# Patient Record
Sex: FEMALE | Race: OTHER | HISPANIC OR LATINO
[De-identification: names, ages, dates, MRNs, and addresses within clinical notes are randomized per-mention and may not be internally consistent; named-entity substitution may affect disease eponyms.]

---

## 2021-10-07 ENCOUNTER — TRANSCRIPTION ENCOUNTER (OUTPATIENT)
Age: 21
End: 2021-10-07

## 2021-12-08 ENCOUNTER — TRANSCRIPTION ENCOUNTER (OUTPATIENT)
Age: 21
End: 2021-12-08

## 2022-08-30 ENCOUNTER — NON-APPOINTMENT (OUTPATIENT)
Age: 22
End: 2022-08-30

## 2022-10-05 ENCOUNTER — NON-APPOINTMENT (OUTPATIENT)
Age: 22
End: 2022-10-05

## 2022-12-13 ENCOUNTER — NON-APPOINTMENT (OUTPATIENT)
Age: 22
End: 2022-12-13

## 2023-08-09 ENCOUNTER — APPOINTMENT (OUTPATIENT)
Dept: NEUROLOGY | Facility: CLINIC | Age: 23
End: 2023-08-09

## 2023-08-14 ENCOUNTER — EMERGENCY (EMERGENCY)
Facility: HOSPITAL | Age: 23
LOS: 1 days | Discharge: HOME | End: 2023-08-14
Attending: EMERGENCY MEDICINE
Payer: COMMERCIAL

## 2023-08-14 VITALS
SYSTOLIC BLOOD PRESSURE: 110 MMHG | RESPIRATION RATE: 18 BRPM | OXYGEN SATURATION: 98 % | TEMPERATURE: 98 F | HEART RATE: 66 BPM | DIASTOLIC BLOOD PRESSURE: 67 MMHG

## 2023-08-14 VITALS
DIASTOLIC BLOOD PRESSURE: 72 MMHG | SYSTOLIC BLOOD PRESSURE: 105 MMHG | HEART RATE: 82 BPM | TEMPERATURE: 98 F | RESPIRATION RATE: 18 BRPM | OXYGEN SATURATION: 97 % | WEIGHT: 132.06 LBS | HEIGHT: 59 IN

## 2023-08-14 LAB
ALBUMIN SERPL ELPH-MCNC: 4.5 G/DL — SIGNIFICANT CHANGE UP (ref 3.3–5)
ALP SERPL-CCNC: 91 U/L — SIGNIFICANT CHANGE UP (ref 40–120)
ALT FLD-CCNC: 18 U/L — SIGNIFICANT CHANGE UP (ref 10–45)
ANION GAP SERPL CALC-SCNC: 15 MMOL/L — SIGNIFICANT CHANGE UP (ref 5–17)
APPEARANCE UR: CLEAR — SIGNIFICANT CHANGE UP
APTT BLD: 31.8 SEC — SIGNIFICANT CHANGE UP (ref 24.5–35.6)
AST SERPL-CCNC: 14 U/L — SIGNIFICANT CHANGE UP (ref 10–40)
BACTERIA # UR AUTO: ABNORMAL
BASOPHILS # BLD AUTO: 0.03 K/UL — SIGNIFICANT CHANGE UP (ref 0–0.2)
BASOPHILS NFR BLD AUTO: 0.4 % — SIGNIFICANT CHANGE UP (ref 0–2)
BILIRUB SERPL-MCNC: 0.7 MG/DL — SIGNIFICANT CHANGE UP (ref 0.2–1.2)
BILIRUB UR-MCNC: NEGATIVE — SIGNIFICANT CHANGE UP
BUN SERPL-MCNC: 12 MG/DL — SIGNIFICANT CHANGE UP (ref 7–23)
CALCIUM SERPL-MCNC: 9.5 MG/DL — SIGNIFICANT CHANGE UP (ref 8.4–10.5)
CHLORIDE SERPL-SCNC: 103 MMOL/L — SIGNIFICANT CHANGE UP (ref 96–108)
CO2 SERPL-SCNC: 21 MMOL/L — LOW (ref 22–31)
COLOR SPEC: YELLOW — SIGNIFICANT CHANGE UP
CREAT SERPL-MCNC: 0.84 MG/DL — SIGNIFICANT CHANGE UP (ref 0.5–1.3)
DIFF PNL FLD: ABNORMAL
EGFR: 101 ML/MIN/1.73M2 — SIGNIFICANT CHANGE UP
EOSINOPHIL # BLD AUTO: 0.14 K/UL — SIGNIFICANT CHANGE UP (ref 0–0.5)
EOSINOPHIL NFR BLD AUTO: 1.7 % — SIGNIFICANT CHANGE UP (ref 0–6)
EPI CELLS # UR: 7 /HPF — HIGH
GLUCOSE SERPL-MCNC: 93 MG/DL — SIGNIFICANT CHANGE UP (ref 70–99)
GLUCOSE UR QL: NEGATIVE — SIGNIFICANT CHANGE UP
HCG SERPL-ACNC: <2 MIU/ML — SIGNIFICANT CHANGE UP
HCG UR QL: NEGATIVE — SIGNIFICANT CHANGE UP
HCT VFR BLD CALC: 37.7 % — SIGNIFICANT CHANGE UP (ref 34.5–45)
HGB BLD-MCNC: 12.3 G/DL — SIGNIFICANT CHANGE UP (ref 11.5–15.5)
HIV 1 & 2 AB SERPL IA.RAPID: SIGNIFICANT CHANGE UP
HYALINE CASTS # UR AUTO: 4 /LPF — HIGH (ref 0–2)
IMM GRANULOCYTES NFR BLD AUTO: 0.4 % — SIGNIFICANT CHANGE UP (ref 0–0.9)
INR BLD: 1.14 RATIO — SIGNIFICANT CHANGE UP (ref 0.85–1.18)
KETONES UR-MCNC: NEGATIVE — SIGNIFICANT CHANGE UP
LEUKOCYTE ESTERASE UR-ACNC: ABNORMAL
LYMPHOCYTES # BLD AUTO: 2.75 K/UL — SIGNIFICANT CHANGE UP (ref 1–3.3)
LYMPHOCYTES # BLD AUTO: 33.1 % — SIGNIFICANT CHANGE UP (ref 13–44)
MCHC RBC-ENTMCNC: 27.5 PG — SIGNIFICANT CHANGE UP (ref 27–34)
MCHC RBC-ENTMCNC: 32.6 GM/DL — SIGNIFICANT CHANGE UP (ref 32–36)
MCV RBC AUTO: 84.2 FL — SIGNIFICANT CHANGE UP (ref 80–100)
MONOCYTES # BLD AUTO: 0.73 K/UL — SIGNIFICANT CHANGE UP (ref 0–0.9)
MONOCYTES NFR BLD AUTO: 8.8 % — SIGNIFICANT CHANGE UP (ref 2–14)
NEUTROPHILS # BLD AUTO: 4.63 K/UL — SIGNIFICANT CHANGE UP (ref 1.8–7.4)
NEUTROPHILS NFR BLD AUTO: 55.6 % — SIGNIFICANT CHANGE UP (ref 43–77)
NITRITE UR-MCNC: POSITIVE
NRBC # BLD: 0 /100 WBCS — SIGNIFICANT CHANGE UP (ref 0–0)
PH UR: 5.5 — SIGNIFICANT CHANGE UP (ref 5–8)
PLATELET # BLD AUTO: 336 K/UL — SIGNIFICANT CHANGE UP (ref 150–400)
POTASSIUM SERPL-MCNC: 3.6 MMOL/L — SIGNIFICANT CHANGE UP (ref 3.5–5.3)
POTASSIUM SERPL-SCNC: 3.6 MMOL/L — SIGNIFICANT CHANGE UP (ref 3.5–5.3)
PROT SERPL-MCNC: 7.3 G/DL — SIGNIFICANT CHANGE UP (ref 6–8.3)
PROT UR-MCNC: ABNORMAL
PROTHROM AB SERPL-ACNC: 11.9 SEC — SIGNIFICANT CHANGE UP (ref 9.5–13)
RAPID RVP RESULT: SIGNIFICANT CHANGE UP
RBC # BLD: 4.48 M/UL — SIGNIFICANT CHANGE UP (ref 3.8–5.2)
RBC # FLD: 13.4 % — SIGNIFICANT CHANGE UP (ref 10.3–14.5)
RBC CASTS # UR COMP ASSIST: 3 /HPF — SIGNIFICANT CHANGE UP (ref 0–4)
SARS-COV-2 RNA SPEC QL NAA+PROBE: SIGNIFICANT CHANGE UP
SODIUM SERPL-SCNC: 139 MMOL/L — SIGNIFICANT CHANGE UP (ref 135–145)
SP GR SPEC: 1.03 — HIGH (ref 1.01–1.02)
TSH SERPL-MCNC: 2.4 UIU/ML — SIGNIFICANT CHANGE UP (ref 0.27–4.2)
UROBILINOGEN FLD QL: ABNORMAL
WBC # BLD: 8.31 K/UL — SIGNIFICANT CHANGE UP (ref 3.8–10.5)
WBC # FLD AUTO: 8.31 K/UL — SIGNIFICANT CHANGE UP (ref 3.8–10.5)
WBC UR QL: 9 /HPF — HIGH (ref 0–5)

## 2023-08-14 PROCEDURE — 85730 THROMBOPLASTIN TIME PARTIAL: CPT

## 2023-08-14 PROCEDURE — 76830 TRANSVAGINAL US NON-OB: CPT

## 2023-08-14 PROCEDURE — 83735 ASSAY OF MAGNESIUM: CPT

## 2023-08-14 PROCEDURE — 99285 EMERGENCY DEPT VISIT HI MDM: CPT

## 2023-08-14 PROCEDURE — 87186 SC STD MICRODIL/AGAR DIL: CPT

## 2023-08-14 PROCEDURE — 84443 ASSAY THYROID STIM HORMONE: CPT

## 2023-08-14 PROCEDURE — 87491 CHLMYD TRACH DNA AMP PROBE: CPT

## 2023-08-14 PROCEDURE — 86703 HIV-1/HIV-2 1 RESULT ANTBDY: CPT

## 2023-08-14 PROCEDURE — 81001 URINALYSIS AUTO W/SCOPE: CPT

## 2023-08-14 PROCEDURE — 84702 CHORIONIC GONADOTROPIN TEST: CPT

## 2023-08-14 PROCEDURE — 93975 VASCULAR STUDY: CPT

## 2023-08-14 PROCEDURE — 85610 PROTHROMBIN TIME: CPT

## 2023-08-14 PROCEDURE — 0225U NFCT DS DNA&RNA 21 SARSCOV2: CPT

## 2023-08-14 PROCEDURE — 76830 TRANSVAGINAL US NON-OB: CPT | Mod: 26

## 2023-08-14 PROCEDURE — 99285 EMERGENCY DEPT VISIT HI MDM: CPT | Mod: 25

## 2023-08-14 PROCEDURE — 80053 COMPREHEN METABOLIC PANEL: CPT

## 2023-08-14 PROCEDURE — 36415 COLL VENOUS BLD VENIPUNCTURE: CPT

## 2023-08-14 PROCEDURE — 87591 N.GONORRHOEAE DNA AMP PROB: CPT

## 2023-08-14 PROCEDURE — 85025 COMPLETE CBC W/AUTO DIFF WBC: CPT

## 2023-08-14 PROCEDURE — 81025 URINE PREGNANCY TEST: CPT

## 2023-08-14 PROCEDURE — 93975 VASCULAR STUDY: CPT | Mod: 26

## 2023-08-14 PROCEDURE — 93005 ELECTROCARDIOGRAM TRACING: CPT

## 2023-08-14 PROCEDURE — 87086 URINE CULTURE/COLONY COUNT: CPT

## 2023-08-14 RX ORDER — CEFPODOXIME PROXETIL 100 MG
1 TABLET ORAL
Qty: 14 | Refills: 0
Start: 2023-08-14 | End: 2023-08-20

## 2023-08-14 RX ORDER — ACETAMINOPHEN 500 MG
975 TABLET ORAL ONCE
Refills: 0 | Status: COMPLETED | OUTPATIENT
Start: 2023-08-14 | End: 2023-08-14

## 2023-08-14 RX ORDER — CEFPODOXIME PROXETIL 100 MG
100 TABLET ORAL ONCE
Refills: 0 | Status: COMPLETED | OUTPATIENT
Start: 2023-08-14 | End: 2023-08-14

## 2023-08-14 RX ORDER — SODIUM CHLORIDE 9 MG/ML
1000 INJECTION INTRAMUSCULAR; INTRAVENOUS; SUBCUTANEOUS ONCE
Refills: 0 | Status: DISCONTINUED | OUTPATIENT
Start: 2023-08-14 | End: 2023-08-14

## 2023-08-14 RX ADMIN — Medication 975 MILLIGRAM(S): at 13:30

## 2023-08-14 RX ADMIN — Medication 100 MILLIGRAM(S): at 14:18

## 2023-08-14 RX ADMIN — Medication 975 MILLIGRAM(S): at 12:35

## 2023-08-14 NOTE — ED ADULT NURSE NOTE - OBJECTIVE STATEMENT
1215 24 y/o f to er w/friend w/c/o resolved palpitations last wk,head pressure on and off x 2wks,and mid pelvic pain xlast thurs. w/white discharge, low grade temp this am. LMP.7/11-15. no odor to discharge. 1215 22 y/o f to er w/friend w/c/o resolved palpitations last wk,head pressure on and off x 2wks,and mid pelvic pain xlast thurs. w/white discharge, low grade temp this am. LMP.7/11-15. no odor to discharge. 1215 22 y/o f to er w/friend w/c/o resolved palpitations last wk,head pressure on and off x 2wks,and mid pelvic pain xlast thurs. w/white discharge, low grade temp this am. LMP.7/11-15. no odor to discharge.1430 TO US. 1215 24 y/o f to er w/friend w/c/o resolved palpitations last wk,head pressure on and off x 2wks,and mid pelvic pain xlast thurs. w/white discharge, low grade temp this am. LMP.7/11-15. no odor to discharge.1430 TO US. 1215 22 y/o f to er w/friend w/c/o resolved palpitations last wk,head pressure on and off x 2wks,and mid pelvic pain xlast thurs. w/white discharge, low grade temp this am. LMP.7/11-15. no odor to discharge.1430 TO US.1530 ret from US w/o c/o.

## 2023-08-14 NOTE — ED PROVIDER NOTE - ATTENDING APP SHARED VISIT CONTRIBUTION OF CARE
RGUJRAL 22yo female hx listed presents with fever, dysuria and urgency. Pt also complains of mild vaginal discharge, sexually active with 1 partner. Pt also reports mild palpitations that were self limiting. On exam, Patient is awake,alert,oriented x 3. Patient is well appearing and in no acute distress. Patient's chest is clear to ausculation, +s1s2. Abdomen is soft nd/nt +BS. Extremity with no swelling or calf tenderness. No cvat. Pelvic exam see PA note.   Check labs, UA, Pelvic sono to eval for UTI, TOA, ovarian cyst. IVF and supportive care and monitor. RGUJRAL 24yo female hx listed presents with fever, dysuria and urgency. Pt also complains of mild vaginal discharge, sexually active with 1 partner. Pt also reports mild palpitations that were self limiting. On exam, Patient is awake,alert,oriented x 3. Patient is well appearing and in no acute distress. Patient's chest is clear to ausculation, +s1s2. Abdomen is soft nd/nt +BS. Extremity with no swelling or calf tenderness. No cvat. Pelvic exam see PA note.   Check labs, UA, Pelvic sono to eval for UTI, TOA, ovarian cyst. IVF and supportive care and monitor.

## 2023-08-14 NOTE — ED ADULT NURSE NOTE - NSFALLUNIVINTERV_ED_ALL_ED
Bed/Stretcher in lowest position, wheels locked, appropriate side rails in place/Call bell, personal items and telephone in reach/Instruct patient to call for assistance before getting out of bed/chair/stretcher/Non-slip footwear applied when patient is off stretcher/[place] to call system/Physically safe environment - no spills, clutter or unnecessary equipment/Purposeful proactive rounding/Room/bathroom lighting operational, light cord in reach Bed/Stretcher in lowest position, wheels locked, appropriate side rails in place/Call bell, personal items and telephone in reach/Instruct patient to call for assistance before getting out of bed/chair/stretcher/Non-slip footwear applied when patient is off stretcher/[place] to call system/Physically safe environment - no spills, clutter or unnecessary equipment/Purposeful proactive rounding/Room/bathroom lighting operational, light cord in reach Bed/Stretcher in lowest position, wheels locked, appropriate side rails in place/Call bell, personal items and telephone in reach/Instruct patient to call for assistance before getting out of bed/chair/stretcher/Non-slip footwear applied when patient is off stretcher/[place] to call system/Physically safe environment - no spills, clutter or unnecessary equipment/Purposeful proactive rounding/Room/bathroom lighting operational, light cord in reach

## 2023-08-14 NOTE — ED PROVIDER NOTE - PATIENT PORTAL LINK FT
You can access the FollowMyHealth Patient Portal offered by [facility] by registering at the following website: [email or web address]. By joining [employer]’s FollowMyHealth portal, you will also be able to view your health information using other applications (apps) compatible with our system. You can access the FollowMyHealth Patient Portal offered by [facility] by registering at the following website: [email or web address]. By joining [employer]’s FollowMyHealth portal, you will also be able to view your health information using other applications (apps) compatible with our system. You can access the FollowMyHealth Patient Portal offered by [facility] by registering at the following website: [email or web address]. By joining [employer]’s FollowMyHealth portal, you will also be able to view your health information using other applications (apps) compatible with our system.

## 2023-08-14 NOTE — ED PROVIDER NOTE - OBJECTIVE STATEMENT
23-year-old female history of ovarian cyst presenting to the emergency department with several complaints.  Patient is reporting 5 days of an increase in vaginal discharge and consistency of vaginal discharge.  Patient reports she is sexually active with 1 partner only and has no concern for STDs.  Patient denies prior history of STDs however has been treated in the past for BV.  Patient does not have an OB/GYN last menstrual period was July 11, reports they are typically irregular.  Patient not on OCPs and does not use protection.  Patient was tested for STDs in May but would like to be tested again today.  Patient also endorsing that she has associated dull pelvic pressure in addition to the increased discharge.  Pain seems to be midline rather than unilateral and feels different than her ovarian cysts.  Patient also had a temp of 100.2 orally this morning.  Patient endorsing waxing and waning bitemporal headaches over the last several days. no other URI sx or sick contacts.  Patient also notes dysuria and some urge incontinence intermittently for 2 weeks.  Lastly patient reports an episode of palpitations that occurred last week, self resolved after 1 day without intervention.  Patient does not have a primary care physician that she can follow-up with.  Patient has not been to any care provider for any of the above symptoms.

## 2023-08-14 NOTE — ED PROVIDER NOTE - NSFOLLOWUPINSTRUCTIONS_ED_ALL_ED_FT
stay hydrated  Take antibiotics as prescribed  Follow up with gynecology regarding your irregular cycles.   Follow up with primary care to establish care  Bring a copy of your test results with you to your appointment  Continue your current medication regimen  Return to the Emergency Room if you experience new or worsening symptoms

## 2023-08-14 NOTE — ED PROVIDER NOTE - PROGRESS NOTE DETAILS
Discussed all results with patient and recommended follow-up with OB/GYN for ultrasound findings and irregular menses.  Will send antibiotic to pharmacy of choice for urinary tract infection.  Patient is well-appearing at this time, comfortable with plan and was given the opportunity to ask questions.

## 2023-08-15 LAB
C TRACH RRNA SPEC QL NAA+PROBE: SIGNIFICANT CHANGE UP
N GONORRHOEA RRNA SPEC QL NAA+PROBE: SIGNIFICANT CHANGE UP
SPECIMEN SOURCE: SIGNIFICANT CHANGE UP

## 2023-08-17 LAB
-  AMIKACIN: SIGNIFICANT CHANGE UP
-  AMOXICILLIN/CLAVULANIC ACID: SIGNIFICANT CHANGE UP
-  AMPICILLIN/SULBACTAM: SIGNIFICANT CHANGE UP
-  AMPICILLIN: SIGNIFICANT CHANGE UP
-  AZTREONAM: SIGNIFICANT CHANGE UP
-  CEFAZOLIN: SIGNIFICANT CHANGE UP
-  CEFEPIME: SIGNIFICANT CHANGE UP
-  CEFOXITIN: SIGNIFICANT CHANGE UP
-  CEFTRIAXONE: SIGNIFICANT CHANGE UP
-  CEFUROXIME: SIGNIFICANT CHANGE UP
-  CIPROFLOXACIN: SIGNIFICANT CHANGE UP
-  ERTAPENEM: SIGNIFICANT CHANGE UP
-  GENTAMICIN: SIGNIFICANT CHANGE UP
-  IMIPENEM: SIGNIFICANT CHANGE UP
-  LEVOFLOXACIN: SIGNIFICANT CHANGE UP
-  MEROPENEM: SIGNIFICANT CHANGE UP
-  NITROFURANTOIN: SIGNIFICANT CHANGE UP
-  PIPERACILLIN/TAZOBACTAM: SIGNIFICANT CHANGE UP
-  TOBRAMYCIN: SIGNIFICANT CHANGE UP
-  TRIMETHOPRIM/SULFAMETHOXAZOLE: SIGNIFICANT CHANGE UP
CULTURE RESULTS: SIGNIFICANT CHANGE UP
METHOD TYPE: SIGNIFICANT CHANGE UP
ORGANISM # SPEC MICROSCOPIC CNT: SIGNIFICANT CHANGE UP
SPECIMEN SOURCE: SIGNIFICANT CHANGE UP

## 2023-08-18 NOTE — ED POST DISCHARGE NOTE - DETAILS
8/18/23 Roselyn PA- final sensitivities show cefpodoxime (antibiotic pt prescribed here) is sensitive. appropriate management given.

## 2023-09-29 ENCOUNTER — APPOINTMENT (OUTPATIENT)
Dept: OBGYN | Facility: CLINIC | Age: 23
End: 2023-09-29
Payer: COMMERCIAL

## 2023-09-29 PROCEDURE — 36415 COLL VENOUS BLD VENIPUNCTURE: CPT

## 2023-09-29 PROCEDURE — 99203 OFFICE O/P NEW LOW 30 MIN: CPT

## 2023-10-01 ENCOUNTER — TRANSCRIPTION ENCOUNTER (OUTPATIENT)
Age: 23
End: 2023-10-01

## 2024-01-08 ENCOUNTER — APPOINTMENT (OUTPATIENT)
Dept: OBGYN | Facility: CLINIC | Age: 24
End: 2024-01-08
Payer: COMMERCIAL

## 2024-01-08 PROCEDURE — 99213 OFFICE O/P EST LOW 20 MIN: CPT | Mod: 25

## 2024-01-08 PROCEDURE — 76817 TRANSVAGINAL US OBSTETRIC: CPT

## 2024-01-08 PROCEDURE — 36415 COLL VENOUS BLD VENIPUNCTURE: CPT

## 2024-01-19 ENCOUNTER — APPOINTMENT (OUTPATIENT)
Dept: OBGYN | Facility: CLINIC | Age: 24
End: 2024-01-19
Payer: COMMERCIAL

## 2024-01-19 PROBLEM — N83.209 UNSPECIFIED OVARIAN CYST, UNSPECIFIED SIDE: Chronic | Status: ACTIVE | Noted: 2023-08-14

## 2024-01-19 PROCEDURE — 76817 TRANSVAGINAL US OBSTETRIC: CPT

## 2024-01-19 PROCEDURE — 0502F SUBSEQUENT PRENATAL CARE: CPT

## 2024-02-02 ENCOUNTER — APPOINTMENT (OUTPATIENT)
Dept: OBGYN | Facility: CLINIC | Age: 24
End: 2024-02-02
Payer: COMMERCIAL

## 2024-02-02 PROCEDURE — 0502F SUBSEQUENT PRENATAL CARE: CPT

## 2024-02-02 PROCEDURE — 76817 TRANSVAGINAL US OBSTETRIC: CPT

## 2024-02-23 ENCOUNTER — APPOINTMENT (OUTPATIENT)
Dept: OBGYN | Facility: CLINIC | Age: 24
End: 2024-02-23
Payer: COMMERCIAL

## 2024-02-23 PROCEDURE — 36415 COLL VENOUS BLD VENIPUNCTURE: CPT

## 2024-02-23 PROCEDURE — 76817 TRANSVAGINAL US OBSTETRIC: CPT

## 2024-02-23 PROCEDURE — 0502F SUBSEQUENT PRENATAL CARE: CPT

## 2024-02-27 ENCOUNTER — NON-APPOINTMENT (OUTPATIENT)
Age: 24
End: 2024-02-27

## 2024-03-05 PROBLEM — Z00.00 ENCOUNTER FOR PREVENTIVE HEALTH EXAMINATION: Status: ACTIVE | Noted: 2024-03-05

## 2024-03-06 ENCOUNTER — APPOINTMENT (OUTPATIENT)
Dept: OBGYN | Facility: CLINIC | Age: 24
End: 2024-03-06

## 2024-03-11 ENCOUNTER — NON-APPOINTMENT (OUTPATIENT)
Age: 24
End: 2024-03-11

## 2024-03-20 ENCOUNTER — OUTPATIENT (OUTPATIENT)
Dept: OUTPATIENT SERVICES | Facility: HOSPITAL | Age: 24
LOS: 1 days | End: 2024-03-20
Payer: MEDICAID

## 2024-03-20 ENCOUNTER — LABORATORY RESULT (OUTPATIENT)
Age: 24
End: 2024-03-20

## 2024-03-20 ENCOUNTER — NON-APPOINTMENT (OUTPATIENT)
Age: 24
End: 2024-03-20

## 2024-03-20 ENCOUNTER — APPOINTMENT (OUTPATIENT)
Dept: OBGYN | Facility: CLINIC | Age: 24
End: 2024-03-20
Payer: MEDICAID

## 2024-03-20 VITALS
DIASTOLIC BLOOD PRESSURE: 82 MMHG | HEIGHT: 64 IN | SYSTOLIC BLOOD PRESSURE: 124 MMHG | BODY MASS INDEX: 24.41 KG/M2 | WEIGHT: 143 LBS

## 2024-03-20 DIAGNOSIS — Z34.80 ENCOUNTER FOR SUPERVISION OF OTHER NORMAL PREGNANCY, UNSPECIFIED TRIMESTER: ICD-10-CM

## 2024-03-20 PROCEDURE — G0463: CPT

## 2024-03-20 PROCEDURE — 36415 COLL VENOUS BLD VENIPUNCTURE: CPT

## 2024-03-20 PROCEDURE — 86850 RBC ANTIBODY SCREEN: CPT

## 2024-03-20 PROCEDURE — G0452: CPT | Mod: 26

## 2024-03-20 PROCEDURE — 99203 OFFICE O/P NEW LOW 30 MIN: CPT

## 2024-03-20 PROCEDURE — 87077 CULTURE AEROBIC IDENTIFY: CPT

## 2024-03-20 PROCEDURE — 81220 CFTR GENE COM VARIANTS: CPT

## 2024-03-20 PROCEDURE — 87086 URINE CULTURE/COLONY COUNT: CPT

## 2024-03-20 PROCEDURE — 81329 SMN1 GENE DOS/DELETION ALYS: CPT

## 2024-03-20 PROCEDURE — 81243 FMR1 GEN ALY DETC ABNL ALLEL: CPT

## 2024-03-20 PROCEDURE — 86900 BLOOD TYPING SEROLOGIC ABO: CPT

## 2024-03-20 RX ORDER — ASPIRIN ENTERIC COATED TABLETS 81 MG 81 MG/1
81 TABLET, DELAYED RELEASE ORAL
Qty: 90 | Refills: 4 | Status: ACTIVE | COMMUNITY
Start: 2024-03-20 | End: 1900-01-01

## 2024-03-21 ENCOUNTER — APPOINTMENT (OUTPATIENT)
Dept: CARDIOLOGY | Facility: CLINIC | Age: 24
End: 2024-03-21
Payer: MEDICAID

## 2024-03-21 VITALS — HEART RATE: 96 BPM | SYSTOLIC BLOOD PRESSURE: 122 MMHG | OXYGEN SATURATION: 100 % | DIASTOLIC BLOOD PRESSURE: 81 MMHG

## 2024-03-21 VITALS — BODY MASS INDEX: 28.83 KG/M2 | WEIGHT: 143 LBS | HEIGHT: 59 IN

## 2024-03-21 DIAGNOSIS — Z78.9 OTHER SPECIFIED HEALTH STATUS: ICD-10-CM

## 2024-03-21 DIAGNOSIS — R51.9 HEADACHE, UNSPECIFIED: ICD-10-CM

## 2024-03-21 DIAGNOSIS — H93.8X1 OTHER SPECIFIED DISORDERS OF RIGHT EAR: ICD-10-CM

## 2024-03-21 DIAGNOSIS — Z87.891 PERSONAL HISTORY OF NICOTINE DEPENDENCE: ICD-10-CM

## 2024-03-21 DIAGNOSIS — R06.00 DYSPNEA, UNSPECIFIED: ICD-10-CM

## 2024-03-21 DIAGNOSIS — Z34.90 ENCOUNTER FOR SUPERVISION OF NORMAL PREGNANCY, UNSPECIFIED, UNSPECIFIED TRIMESTER: ICD-10-CM

## 2024-03-21 DIAGNOSIS — R07.89 OTHER CHEST PAIN: ICD-10-CM

## 2024-03-21 DIAGNOSIS — R00.2 PALPITATIONS: ICD-10-CM

## 2024-03-21 DIAGNOSIS — R55 SYNCOPE AND COLLAPSE: ICD-10-CM

## 2024-03-21 DIAGNOSIS — R42 DIZZINESS AND GIDDINESS: ICD-10-CM

## 2024-03-21 DIAGNOSIS — O09.32 SUPERVISION OF PREGNANCY WITH INSUFFICIENT ANTENATAL CARE, SECOND TRIMESTER: ICD-10-CM

## 2024-03-21 PROCEDURE — 99205 OFFICE O/P NEW HI 60 MIN: CPT

## 2024-03-21 PROCEDURE — G2211 COMPLEX E/M VISIT ADD ON: CPT | Mod: NC,1L

## 2024-03-21 PROCEDURE — 93246 EXT ECG>7D<15D RECORDING: CPT

## 2024-03-21 PROCEDURE — 93000 ELECTROCARDIOGRAM COMPLETE: CPT | Mod: 59

## 2024-03-21 NOTE — PHYSICAL EXAM
[Well Developed] : well developed [No Acute Distress] : no acute distress [Well Nourished] : well nourished [Normal Conjunctiva] : normal conjunctiva [Normal Venous Pressure] : normal venous pressure [No Carotid Bruit] : no carotid bruit [Normal S1, S2] : normal S1, S2 [No Murmur] : no murmur [No Gallop] : no gallop [No Rub] : no rub [Good Air Entry] : good air entry [Clear Lung Fields] : clear lung fields [Soft] : abdomen soft [No Respiratory Distress] : no respiratory distress  [Non Tender] : non-tender [No Masses/organomegaly] : no masses/organomegaly [Normal Bowel Sounds] : normal bowel sounds [Normal Gait] : normal gait [No Edema] : no edema [No Cyanosis] : no cyanosis [No Clubbing] : no clubbing [No Rash] : no rash [No Varicosities] : no varicosities [No Skin Lesions] : no skin lesions [Moves all extremities] : moves all extremities [No Focal Deficits] : no focal deficits [Normal Speech] : normal speech [Alert and Oriented] : alert and oriented [Normal memory] : normal memory

## 2024-03-21 NOTE — HISTORY OF PRESENT ILLNESS
[FreeTextEntry1] : This is a very pleasant 23-year-old woman with no significant past medical history, currently 14 weeks pregnant in her second trimester presenting for evaluation of palpitations.  She has had palpitations in the past but it has gotten worse with pregnancy.  Last week she felt a episode of vasovagal syncope without actually losing consciousness.  She describes a ascending sensation of warmth, tunnel vision, chest pressure.  She sat down and had some candy which improved her symptoms.  Since then she has had 1 more episode of palpitations, this time at rest while laying in bed.  She denies having caffeine, drugs or alcohol to trigger palpitations.  She has had morning sickness but has been able to tolerate food and is hydrating and eating well.  Denies shortness of breath, chest pain, orthopnea, PND.  EKG today appears to be sinus rhythm without evidence of ischemia

## 2024-03-21 NOTE — DISCUSSION/SUMMARY
[FreeTextEntry1] : Ms. KAYLA CASTRO is a very pleasant 23 year old woman, 14 weeks pregnant presenting with palpitations.   #Palpitations -Explained to patient that second trimester of pregnancy is where volume expansion occurs and as such requires hydration and sustenance.  She can continue to feel palpitations, which are most likely benign.  However given the fact that the symptoms preceded her pregnancy would be prudent to further investigate with a Holter monitor. -14-day Zio patch was ordered and placed on patient in office today - Encouraged to decrease caffeine intake as much as possible, currently not drinking any - Encourage to continue exercise - ECG sinus rhythm today - 14 day Ziopatch ordered to assess for arrhythmia - Stress-lowering therapies and exercises were strongly recommended  Follow-up 2 months [EKG obtained to assist in diagnosis and management of assessed problem(s)] : EKG obtained to assist in diagnosis and management of assessed problem(s)

## 2024-03-22 ENCOUNTER — APPOINTMENT (OUTPATIENT)
Dept: MATERNAL FETAL MEDICINE | Facility: CLINIC | Age: 24
End: 2024-03-22

## 2024-03-22 LAB
CULTURE RESULTS: SIGNIFICANT CHANGE UP
FRAGILE X PROTEIN (FMRP) PNL BLD: SIGNIFICANT CHANGE UP
SPECIMEN SOURCE: SIGNIFICANT CHANGE UP

## 2024-03-26 LAB — SMA INTERPRETATION: SIGNIFICANT CHANGE UP

## 2024-03-27 LAB — CYSTIC FIBROSIS EXPANDED PANEL: SIGNIFICANT CHANGE UP

## 2024-04-01 ENCOUNTER — NON-APPOINTMENT (OUTPATIENT)
Age: 24
End: 2024-04-01

## 2024-04-01 ENCOUNTER — LABORATORY RESULT (OUTPATIENT)
Age: 24
End: 2024-04-01

## 2024-04-01 ENCOUNTER — OUTPATIENT (OUTPATIENT)
Dept: OUTPATIENT SERVICES | Facility: HOSPITAL | Age: 24
LOS: 1 days | End: 2024-04-01
Payer: MEDICAID

## 2024-04-01 ENCOUNTER — ASOB RESULT (OUTPATIENT)
Age: 24
End: 2024-04-01

## 2024-04-01 ENCOUNTER — APPOINTMENT (OUTPATIENT)
Dept: OBGYN | Facility: CLINIC | Age: 24
End: 2024-04-01
Payer: MEDICAID

## 2024-04-01 ENCOUNTER — APPOINTMENT (OUTPATIENT)
Dept: ANTEPARTUM | Facility: CLINIC | Age: 24
End: 2024-04-01
Payer: MEDICAID

## 2024-04-01 VITALS
SYSTOLIC BLOOD PRESSURE: 116 MMHG | WEIGHT: 142 LBS | BODY MASS INDEX: 26.81 KG/M2 | DIASTOLIC BLOOD PRESSURE: 74 MMHG | HEIGHT: 61 IN

## 2024-04-01 DIAGNOSIS — Z34.80 ENCOUNTER FOR SUPERVISION OF OTHER NORMAL PREGNANCY, UNSPECIFIED TRIMESTER: ICD-10-CM

## 2024-04-01 DIAGNOSIS — O09.32 SUPERVISION OF PREGNANCY WITH INSUFFICIENT ANTENATAL CARE, SECOND TRIMESTER: ICD-10-CM

## 2024-04-01 PROCEDURE — 82105 ALPHA-FETOPROTEIN SERUM: CPT

## 2024-04-01 PROCEDURE — 36415 COLL VENOUS BLD VENIPUNCTURE: CPT

## 2024-04-01 PROCEDURE — 81003 URINALYSIS AUTO W/O SCOPE: CPT

## 2024-04-01 PROCEDURE — 99213 OFFICE O/P EST LOW 20 MIN: CPT

## 2024-04-01 PROCEDURE — G0463: CPT

## 2024-04-01 PROCEDURE — 76805 OB US >/= 14 WKS SNGL FETUS: CPT

## 2024-04-01 PROCEDURE — 87086 URINE CULTURE/COLONY COUNT: CPT

## 2024-04-01 PROCEDURE — 86480 TB TEST CELL IMMUN MEASURE: CPT

## 2024-04-02 DIAGNOSIS — O09.32 SUPERVISION OF PREGNANCY WITH INSUFFICIENT ANTENATAL CARE, SECOND TRIMESTER: ICD-10-CM

## 2024-04-02 LAB
CULTURE RESULTS: SIGNIFICANT CHANGE UP
SPECIMEN SOURCE: SIGNIFICANT CHANGE UP

## 2024-04-03 LAB
GAMMA INTERFERON BACKGROUND BLD IA-ACNC: 0.05 IU/ML — SIGNIFICANT CHANGE UP
M TB IFN-G BLD-IMP: NEGATIVE — SIGNIFICANT CHANGE UP
M TB IFN-G CD4+ BCKGRND COR BLD-ACNC: 0 IU/ML — SIGNIFICANT CHANGE UP
M TB IFN-G CD4+CD8+ BCKGRND COR BLD-ACNC: 0 IU/ML — SIGNIFICANT CHANGE UP
QUANT TB PLUS MITOGEN MINUS NIL: >10 IU/ML — SIGNIFICANT CHANGE UP

## 2024-04-04 ENCOUNTER — APPOINTMENT (OUTPATIENT)
Dept: OBGYN | Facility: CLINIC | Age: 24
End: 2024-04-04

## 2024-04-05 LAB
AFP ADJ MOM SERPL: 1.45 — SIGNIFICANT CHANGE UP
AFP INTERP SERPL-IMP: SIGNIFICANT CHANGE UP
AFP INTERP SERPL-IMP: SIGNIFICANT CHANGE UP
AFP SERPL-MCNC: 49.9 NG/ML — SIGNIFICANT CHANGE UP
AGE AT DELIVERY: 23.2 YR — SIGNIFICANT CHANGE UP
ALPHA FETOPROTEIN SERUM COMMENT: SIGNIFICANT CHANGE UP
ALPHA FETOPROTEIN SERUM RESULTS: SIGNIFICANT CHANGE UP
GA METHOD: SIGNIFICANT CHANGE UP
GA: 15.9 WEEKS — SIGNIFICANT CHANGE UP
IDDM PATIENT QL: NO — SIGNIFICANT CHANGE UP
MULTIPLE PREGNANCY: NO — SIGNIFICANT CHANGE UP
NEURAL TUBE DEFECT RISK FETUS: 3133 — SIGNIFICANT CHANGE UP
RACE AFP: SIGNIFICANT CHANGE UP

## 2024-04-18 ENCOUNTER — TRANSCRIPTION ENCOUNTER (OUTPATIENT)
Age: 24
End: 2024-04-18

## 2024-04-19 ENCOUNTER — NON-APPOINTMENT (OUTPATIENT)
Age: 24
End: 2024-04-19

## 2024-04-22 ENCOUNTER — APPOINTMENT (OUTPATIENT)
Dept: OBGYN | Facility: CLINIC | Age: 24
End: 2024-04-22
Payer: MEDICAID

## 2024-04-22 ENCOUNTER — OUTPATIENT (OUTPATIENT)
Dept: OUTPATIENT SERVICES | Facility: HOSPITAL | Age: 24
LOS: 1 days | End: 2024-04-22
Payer: MEDICAID

## 2024-04-22 VITALS — SYSTOLIC BLOOD PRESSURE: 120 MMHG | WEIGHT: 148 LBS | DIASTOLIC BLOOD PRESSURE: 74 MMHG

## 2024-04-22 DIAGNOSIS — Z34.90 ENCOUNTER FOR SUPERVISION OF NORMAL PREGNANCY, UNSPECIFIED, UNSPECIFIED TRIMESTER: ICD-10-CM

## 2024-04-22 DIAGNOSIS — Z34.80 ENCOUNTER FOR SUPERVISION OF OTHER NORMAL PREGNANCY, UNSPECIFIED TRIMESTER: ICD-10-CM

## 2024-04-22 PROCEDURE — 81003 URINALYSIS AUTO W/O SCOPE: CPT

## 2024-04-22 PROCEDURE — 99213 OFFICE O/P EST LOW 20 MIN: CPT

## 2024-04-22 PROCEDURE — G0463: CPT

## 2024-04-29 DIAGNOSIS — Z34.90 ENCOUNTER FOR SUPERVISION OF NORMAL PREGNANCY, UNSPECIFIED, UNSPECIFIED TRIMESTER: ICD-10-CM

## 2024-04-29 DIAGNOSIS — Z34.92 ENCOUNTER FOR SUPERVISION OF NORMAL PREGNANCY, UNSPECIFIED, SECOND TRIMESTER: ICD-10-CM

## 2024-05-06 ENCOUNTER — APPOINTMENT (OUTPATIENT)
Dept: ANTEPARTUM | Facility: CLINIC | Age: 24
End: 2024-05-06

## 2024-05-06 ENCOUNTER — APPOINTMENT (OUTPATIENT)
Dept: OBGYN | Facility: CLINIC | Age: 24
End: 2024-05-06

## 2024-05-13 ENCOUNTER — APPOINTMENT (OUTPATIENT)
Dept: ANTEPARTUM | Facility: CLINIC | Age: 24
End: 2024-05-13
Payer: MEDICAID

## 2024-05-13 ENCOUNTER — ASOB RESULT (OUTPATIENT)
Age: 24
End: 2024-05-13

## 2024-05-13 PROCEDURE — 76816 OB US FOLLOW-UP PER FETUS: CPT

## 2024-05-20 ENCOUNTER — APPOINTMENT (OUTPATIENT)
Dept: OBGYN | Facility: CLINIC | Age: 24
End: 2024-05-20
Payer: MEDICAID

## 2024-05-20 ENCOUNTER — LABORATORY RESULT (OUTPATIENT)
Age: 24
End: 2024-05-20

## 2024-05-20 ENCOUNTER — OUTPATIENT (OUTPATIENT)
Dept: OUTPATIENT SERVICES | Facility: HOSPITAL | Age: 24
LOS: 1 days | End: 2024-05-20
Payer: MEDICAID

## 2024-05-20 VITALS — SYSTOLIC BLOOD PRESSURE: 120 MMHG | WEIGHT: 155 LBS | DIASTOLIC BLOOD PRESSURE: 80 MMHG

## 2024-05-20 DIAGNOSIS — Z34.92 ENCOUNTER FOR SUPERVISION OF NORMAL PREGNANCY, UNSPECIFIED, SECOND TRIMESTER: ICD-10-CM

## 2024-05-20 DIAGNOSIS — Z34.80 ENCOUNTER FOR SUPERVISION OF OTHER NORMAL PREGNANCY, UNSPECIFIED TRIMESTER: ICD-10-CM

## 2024-05-20 DIAGNOSIS — N89.8 OTHER SPECIFIED NONINFLAMMATORY DISORDERS OF VAGINA: ICD-10-CM

## 2024-05-20 PROCEDURE — 87491 CHLMYD TRACH DNA AMP PROBE: CPT

## 2024-05-20 PROCEDURE — 87661 TRICHOMONAS VAGINALIS AMPLIF: CPT

## 2024-05-20 PROCEDURE — 87481 CANDIDA DNA AMP PROBE: CPT

## 2024-05-20 PROCEDURE — 87591 N.GONORRHOEAE DNA AMP PROB: CPT

## 2024-05-20 PROCEDURE — 81513 NFCT DS BV RNA VAG FLU ALG: CPT

## 2024-05-20 PROCEDURE — 99213 OFFICE O/P EST LOW 20 MIN: CPT

## 2024-05-20 PROCEDURE — G0463: CPT

## 2024-05-20 PROCEDURE — 81003 URINALYSIS AUTO W/O SCOPE: CPT

## 2024-05-21 DIAGNOSIS — Z34.92 ENCOUNTER FOR SUPERVISION OF NORMAL PREGNANCY, UNSPECIFIED, SECOND TRIMESTER: ICD-10-CM

## 2024-05-21 LAB
BV BACTERIA RRNA VAG QL NAA+PROBE: SIGNIFICANT CHANGE UP
C GLABRATA RNA VAG QL NAA+PROBE: SIGNIFICANT CHANGE UP
C TRACH RRNA SPEC QL NAA+PROBE: SIGNIFICANT CHANGE UP
CANDIDA RRNA VAG QL PROBE: SIGNIFICANT CHANGE UP
N GONORRHOEA RRNA SPEC QL NAA+PROBE: SIGNIFICANT CHANGE UP
T VAGINALIS RRNA SPEC QL NAA+PROBE: SIGNIFICANT CHANGE UP

## 2024-06-07 ENCOUNTER — NON-APPOINTMENT (OUTPATIENT)
Age: 24
End: 2024-06-07

## 2024-06-14 ENCOUNTER — APPOINTMENT (OUTPATIENT)
Dept: CARDIOLOGY | Facility: CLINIC | Age: 24
End: 2024-06-14

## 2024-06-17 ENCOUNTER — OUTPATIENT (OUTPATIENT)
Dept: OUTPATIENT SERVICES | Facility: HOSPITAL | Age: 24
LOS: 1 days | End: 2024-06-17
Payer: MEDICAID

## 2024-06-17 ENCOUNTER — APPOINTMENT (OUTPATIENT)
Dept: OBGYN | Facility: CLINIC | Age: 24
End: 2024-06-17
Payer: MEDICAID

## 2024-06-17 ENCOUNTER — LABORATORY RESULT (OUTPATIENT)
Age: 24
End: 2024-06-17

## 2024-06-17 VITALS — DIASTOLIC BLOOD PRESSURE: 80 MMHG | SYSTOLIC BLOOD PRESSURE: 128 MMHG | WEIGHT: 157 LBS

## 2024-06-17 DIAGNOSIS — Z34.80 ENCOUNTER FOR SUPERVISION OF OTHER NORMAL PREGNANCY, UNSPECIFIED TRIMESTER: ICD-10-CM

## 2024-06-17 PROCEDURE — 99213 OFFICE O/P EST LOW 20 MIN: CPT

## 2024-06-18 DIAGNOSIS — Z34.92 ENCOUNTER FOR SUPERVISION OF NORMAL PREGNANCY, UNSPECIFIED, SECOND TRIMESTER: ICD-10-CM

## 2024-06-18 DIAGNOSIS — Z34.90 ENCOUNTER FOR SUPERVISION OF NORMAL PREGNANCY, UNSPECIFIED, UNSPECIFIED TRIMESTER: ICD-10-CM

## 2024-06-18 DIAGNOSIS — R73.09 OTHER ABNORMAL GLUCOSE: ICD-10-CM

## 2024-06-18 DIAGNOSIS — O99.810 ABNORMAL GLUCOSE COMPLICATING PREGNANCY: ICD-10-CM

## 2024-06-18 LAB — GLUCOSE 1H P MEAL SERPL-MCNC: 137 MG/DL — HIGH (ref 70–134)

## 2024-06-21 ENCOUNTER — LABORATORY RESULT (OUTPATIENT)
Age: 24
End: 2024-06-21

## 2024-06-21 PROCEDURE — 82952 GTT-ADDED SAMPLES: CPT

## 2024-06-21 PROCEDURE — G0463: CPT

## 2024-06-21 PROCEDURE — 82950 GLUCOSE TEST: CPT

## 2024-06-21 PROCEDURE — 81003 URINALYSIS AUTO W/O SCOPE: CPT

## 2024-06-21 PROCEDURE — 82951 GLUCOSE TOLERANCE TEST (GTT): CPT

## 2024-06-22 LAB
GESTATIONAL GTT PNL UR+SERPL: 90 MG/DL — SIGNIFICANT CHANGE UP (ref 70–94)
GLUCOSE 1H P CHAL SERPL-MCNC: 167 MG/DL — SIGNIFICANT CHANGE UP (ref 70–179)
GTT GEST 2H PNL UR+SERPL: 114 MG/DL — SIGNIFICANT CHANGE UP (ref 70–154)
GTT GEST 3H PNL SERPL: 158 MG/DL — HIGH (ref 70–139)

## 2024-06-23 ENCOUNTER — NON-APPOINTMENT (OUTPATIENT)
Age: 24
End: 2024-06-23

## 2024-06-27 ENCOUNTER — NON-APPOINTMENT (OUTPATIENT)
Age: 24
End: 2024-06-27

## 2024-06-28 ENCOUNTER — NON-APPOINTMENT (OUTPATIENT)
Age: 24
End: 2024-06-28

## 2024-06-28 DIAGNOSIS — U07.1 COVID-19: ICD-10-CM

## 2024-07-08 ENCOUNTER — APPOINTMENT (OUTPATIENT)
Dept: OBGYN | Facility: CLINIC | Age: 24
End: 2024-07-08
Payer: MEDICAID

## 2024-07-08 ENCOUNTER — APPOINTMENT (OUTPATIENT)
Dept: ANTEPARTUM | Facility: CLINIC | Age: 24
End: 2024-07-08
Payer: MEDICAID

## 2024-07-08 ENCOUNTER — ASOB RESULT (OUTPATIENT)
Age: 24
End: 2024-07-08

## 2024-07-08 ENCOUNTER — OUTPATIENT (OUTPATIENT)
Dept: OUTPATIENT SERVICES | Facility: HOSPITAL | Age: 24
LOS: 1 days | End: 2024-07-08
Payer: MEDICAID

## 2024-07-08 ENCOUNTER — LABORATORY RESULT (OUTPATIENT)
Age: 24
End: 2024-07-08

## 2024-07-08 VITALS — WEIGHT: 157 LBS | DIASTOLIC BLOOD PRESSURE: 72 MMHG | SYSTOLIC BLOOD PRESSURE: 118 MMHG

## 2024-07-08 DIAGNOSIS — Z34.80 ENCOUNTER FOR SUPERVISION OF OTHER NORMAL PREGNANCY, UNSPECIFIED TRIMESTER: ICD-10-CM

## 2024-07-08 DIAGNOSIS — O99.019 ANEMIA COMPLICATING PREGNANCY, UNSPECIFIED TRIMESTER: ICD-10-CM

## 2024-07-08 PROCEDURE — 76816 OB US FOLLOW-UP PER FETUS: CPT

## 2024-07-08 PROCEDURE — 99213 OFFICE O/P EST LOW 20 MIN: CPT

## 2024-07-08 PROCEDURE — 81003 URINALYSIS AUTO W/O SCOPE: CPT

## 2024-07-08 PROCEDURE — G0463: CPT

## 2024-07-09 PROBLEM — O99.019 ANEMIA DURING PREGNANCY: Status: ACTIVE | Noted: 2024-07-09

## 2024-07-09 RX ORDER — SAW PALMETTO 160 MG
500 CAPSULE ORAL DAILY
Qty: 30 | Refills: 0 | Status: ACTIVE | COMMUNITY
Start: 2024-07-09 | End: 1900-01-01

## 2024-07-09 RX ORDER — FERROUS SULFATE TAB EC 325 MG (65 MG FE EQUIVALENT) 325 (65 FE) MG
325 (65 FE) TABLET DELAYED RESPONSE ORAL
Qty: 60 | Refills: 2 | Status: ACTIVE | COMMUNITY
Start: 2024-07-09 | End: 1900-01-01

## 2024-07-11 DIAGNOSIS — Z34.93 ENCOUNTER FOR SUPERVISION OF NORMAL PREGNANCY, UNSPECIFIED, THIRD TRIMESTER: ICD-10-CM

## 2024-07-11 DIAGNOSIS — Z34.90 ENCOUNTER FOR SUPERVISION OF NORMAL PREGNANCY, UNSPECIFIED, UNSPECIFIED TRIMESTER: ICD-10-CM

## 2024-07-23 ENCOUNTER — NON-APPOINTMENT (OUTPATIENT)
Age: 24
End: 2024-07-23

## 2024-07-29 ENCOUNTER — APPOINTMENT (OUTPATIENT)
Dept: OBGYN | Facility: CLINIC | Age: 24
End: 2024-07-29
Payer: MEDICAID

## 2024-07-29 ENCOUNTER — OUTPATIENT (OUTPATIENT)
Dept: OUTPATIENT SERVICES | Facility: HOSPITAL | Age: 24
LOS: 1 days | End: 2024-07-29
Payer: MEDICAID

## 2024-07-29 VITALS
SYSTOLIC BLOOD PRESSURE: 132 MMHG | WEIGHT: 160 LBS | DIASTOLIC BLOOD PRESSURE: 82 MMHG | BODY MASS INDEX: 30.21 KG/M2 | HEIGHT: 61 IN

## 2024-07-29 DIAGNOSIS — Z23 ENCOUNTER FOR IMMUNIZATION: ICD-10-CM

## 2024-07-29 DIAGNOSIS — Z34.90 ENCOUNTER FOR SUPERVISION OF NORMAL PREGNANCY, UNSPECIFIED, UNSPECIFIED TRIMESTER: ICD-10-CM

## 2024-07-29 DIAGNOSIS — Z34.80 ENCOUNTER FOR SUPERVISION OF OTHER NORMAL PREGNANCY, UNSPECIFIED TRIMESTER: ICD-10-CM

## 2024-07-29 DIAGNOSIS — Z34.93 ENCOUNTER FOR SUPERVISION OF NORMAL PREGNANCY, UNSPECIFIED, THIRD TRIMESTER: ICD-10-CM

## 2024-07-29 PROCEDURE — 99213 OFFICE O/P EST LOW 20 MIN: CPT

## 2024-07-31 ENCOUNTER — NON-APPOINTMENT (OUTPATIENT)
Age: 24
End: 2024-07-31

## 2024-08-05 ENCOUNTER — ASOB RESULT (OUTPATIENT)
Age: 24
End: 2024-08-05

## 2024-08-05 ENCOUNTER — APPOINTMENT (OUTPATIENT)
Dept: ANTEPARTUM | Facility: CLINIC | Age: 24
End: 2024-08-05

## 2024-08-05 PROCEDURE — 76816 OB US FOLLOW-UP PER FETUS: CPT

## 2024-08-12 ENCOUNTER — OUTPATIENT (OUTPATIENT)
Dept: OUTPATIENT SERVICES | Facility: HOSPITAL | Age: 24
LOS: 1 days | End: 2024-08-12
Payer: MEDICAID

## 2024-08-12 ENCOUNTER — APPOINTMENT (OUTPATIENT)
Dept: OBGYN | Facility: CLINIC | Age: 24
End: 2024-08-12
Payer: MEDICAID

## 2024-08-12 ENCOUNTER — LABORATORY RESULT (OUTPATIENT)
Age: 24
End: 2024-08-12

## 2024-08-12 ENCOUNTER — NON-APPOINTMENT (OUTPATIENT)
Age: 24
End: 2024-08-12

## 2024-08-12 ENCOUNTER — APPOINTMENT (OUTPATIENT)
Dept: OBGYN | Facility: CLINIC | Age: 24
End: 2024-08-12

## 2024-08-12 VITALS — WEIGHT: 165 LBS | SYSTOLIC BLOOD PRESSURE: 120 MMHG | DIASTOLIC BLOOD PRESSURE: 82 MMHG

## 2024-08-12 VITALS
BODY MASS INDEX: 31.15 KG/M2 | DIASTOLIC BLOOD PRESSURE: 82 MMHG | SYSTOLIC BLOOD PRESSURE: 120 MMHG | WEIGHT: 165 LBS | HEIGHT: 61 IN

## 2024-08-12 DIAGNOSIS — Z34.90 ENCOUNTER FOR SUPERVISION OF NORMAL PREGNANCY, UNSPECIFIED, UNSPECIFIED TRIMESTER: ICD-10-CM

## 2024-08-12 DIAGNOSIS — O26.893 OTHER SPECIFIED PREGNANCY RELATED CONDITIONS, THIRD TRIMESTER: ICD-10-CM

## 2024-08-12 DIAGNOSIS — Z34.92 ENCOUNTER FOR SUPERVISION OF NORMAL PREGNANCY, UNSPECIFIED, SECOND TRIMESTER: ICD-10-CM

## 2024-08-12 DIAGNOSIS — R51.9 OTHER SPECIFIED PREGNANCY RELATED CONDITIONS, THIRD TRIMESTER: ICD-10-CM

## 2024-08-12 PROCEDURE — 80053 COMPREHEN METABOLIC PANEL: CPT

## 2024-08-12 PROCEDURE — G0463: CPT

## 2024-08-12 PROCEDURE — 85610 PROTHROMBIN TIME: CPT

## 2024-08-12 PROCEDURE — 99213 OFFICE O/P EST LOW 20 MIN: CPT

## 2024-08-12 PROCEDURE — 84550 ASSAY OF BLOOD/URIC ACID: CPT

## 2024-08-12 PROCEDURE — 81003 URINALYSIS AUTO W/O SCOPE: CPT

## 2024-08-12 PROCEDURE — 83615 LACTATE (LD) (LDH) ENZYME: CPT

## 2024-08-12 PROCEDURE — 90471 IMMUNIZATION ADMIN: CPT

## 2024-08-12 PROCEDURE — 90651 9VHPV VACCINE 2/3 DOSE IM: CPT

## 2024-08-13 LAB
ALBUMIN SERPL ELPH-MCNC: 3.4 G/DL — SIGNIFICANT CHANGE UP (ref 3.3–5)
ALP SERPL-CCNC: 149 U/L — HIGH (ref 40–120)
ALT FLD-CCNC: <5 U/L — LOW (ref 10–45)
ANION GAP SERPL CALC-SCNC: 14 MMOL/L — SIGNIFICANT CHANGE UP (ref 5–17)
AST SERPL-CCNC: 12 U/L — SIGNIFICANT CHANGE UP (ref 10–40)
BILIRUB SERPL-MCNC: 0.4 MG/DL — SIGNIFICANT CHANGE UP (ref 0.2–1.2)
BUN SERPL-MCNC: 6 MG/DL — LOW (ref 7–23)
CALCIUM SERPL-MCNC: 10.3 MG/DL — SIGNIFICANT CHANGE UP (ref 8.4–10.5)
CHLORIDE SERPL-SCNC: 103 MMOL/L — SIGNIFICANT CHANGE UP (ref 96–108)
CO2 SERPL-SCNC: 21 MMOL/L — LOW (ref 22–31)
CREAT SERPL-MCNC: 0.7 MG/DL — SIGNIFICANT CHANGE UP (ref 0.5–1.3)
EGFR: 125 ML/MIN/1.73M2 — SIGNIFICANT CHANGE UP
GLUCOSE SERPL-MCNC: 92 MG/DL — SIGNIFICANT CHANGE UP (ref 70–99)
INR BLD: 0.98 RATIO — SIGNIFICANT CHANGE UP (ref 0.85–1.18)
LDH SERPL L TO P-CCNC: 154 U/L — SIGNIFICANT CHANGE UP (ref 50–242)
POTASSIUM SERPL-MCNC: 3.5 MMOL/L — SIGNIFICANT CHANGE UP (ref 3.5–5.3)
POTASSIUM SERPL-SCNC: 3.5 MMOL/L — SIGNIFICANT CHANGE UP (ref 3.5–5.3)
PROT SERPL-MCNC: 6.3 G/DL — SIGNIFICANT CHANGE UP (ref 6–8.3)
PROTHROM AB SERPL-ACNC: 11.1 SEC — SIGNIFICANT CHANGE UP (ref 9.5–13)
SODIUM SERPL-SCNC: 138 MMOL/L — SIGNIFICANT CHANGE UP (ref 135–145)
URATE SERPL-MCNC: 4.1 MG/DL — SIGNIFICANT CHANGE UP (ref 2.5–7)

## 2024-08-21 ENCOUNTER — NON-APPOINTMENT (OUTPATIENT)
Age: 24
End: 2024-08-21

## 2024-08-23 ENCOUNTER — APPOINTMENT (OUTPATIENT)
Dept: OBGYN | Facility: CLINIC | Age: 24
End: 2024-08-23
Payer: MEDICAID

## 2024-08-23 ENCOUNTER — OUTPATIENT (OUTPATIENT)
Dept: OUTPATIENT SERVICES | Facility: HOSPITAL | Age: 24
LOS: 1 days | End: 2024-08-23
Payer: MEDICAID

## 2024-08-23 ENCOUNTER — NON-APPOINTMENT (OUTPATIENT)
Age: 24
End: 2024-08-23

## 2024-08-23 ENCOUNTER — LABORATORY RESULT (OUTPATIENT)
Age: 24
End: 2024-08-23

## 2024-08-23 VITALS
SYSTOLIC BLOOD PRESSURE: 131 MMHG | TEMPERATURE: 98 F | RESPIRATION RATE: 16 BRPM | HEART RATE: 104 BPM | DIASTOLIC BLOOD PRESSURE: 90 MMHG

## 2024-08-23 VITALS — HEART RATE: 97 BPM | DIASTOLIC BLOOD PRESSURE: 63 MMHG | OXYGEN SATURATION: 96 % | SYSTOLIC BLOOD PRESSURE: 139 MMHG

## 2024-08-23 VITALS — SYSTOLIC BLOOD PRESSURE: 140 MMHG | WEIGHT: 166 LBS | DIASTOLIC BLOOD PRESSURE: 88 MMHG

## 2024-08-23 DIAGNOSIS — O09.32 SUPERVISION OF PREGNANCY WITH INSUFFICIENT ANTENATAL CARE, SECOND TRIMESTER: ICD-10-CM

## 2024-08-23 DIAGNOSIS — Z34.93 ENCOUNTER FOR SUPERVISION OF NORMAL PREGNANCY, UNSPECIFIED, THIRD TRIMESTER: ICD-10-CM

## 2024-08-23 DIAGNOSIS — Z34.90 ENCOUNTER FOR SUPERVISION OF NORMAL PREGNANCY, UNSPECIFIED, UNSPECIFIED TRIMESTER: ICD-10-CM

## 2024-08-23 DIAGNOSIS — O26.899 OTHER SPECIFIED PREGNANCY RELATED CONDITIONS, UNSPECIFIED TRIMESTER: ICD-10-CM

## 2024-08-23 DIAGNOSIS — O13.3 GESTATIONAL [PREGNANCY-INDUCED] HYPERTENSION W/OUT SIGNIFICANT PROTEINURIA, THIRD TRIMESTER: ICD-10-CM

## 2024-08-23 DIAGNOSIS — Z34.80 ENCOUNTER FOR SUPERVISION OF OTHER NORMAL PREGNANCY, UNSPECIFIED TRIMESTER: ICD-10-CM

## 2024-08-23 LAB
ALBUMIN SERPL ELPH-MCNC: 3 G/DL — LOW (ref 3.3–5)
ALP SERPL-CCNC: 144 U/L — HIGH (ref 40–120)
ALT FLD-CCNC: 9 U/L — LOW (ref 10–45)
ANION GAP SERPL CALC-SCNC: 12 MMOL/L — SIGNIFICANT CHANGE UP (ref 5–17)
APPEARANCE UR: ABNORMAL
AST SERPL-CCNC: 12 U/L — SIGNIFICANT CHANGE UP (ref 10–40)
BACTERIA # UR AUTO: ABNORMAL /HPF
BASOPHILS # BLD AUTO: 0.03 K/UL — SIGNIFICANT CHANGE UP (ref 0–0.2)
BASOPHILS NFR BLD AUTO: 0.2 % — SIGNIFICANT CHANGE UP (ref 0–2)
BILIRUB SERPL-MCNC: 0.4 MG/DL — SIGNIFICANT CHANGE UP (ref 0.2–1.2)
BILIRUB UR-MCNC: NEGATIVE — SIGNIFICANT CHANGE UP
BLD GP AB SCN SERPL QL: NEGATIVE — SIGNIFICANT CHANGE UP
BUN SERPL-MCNC: 6 MG/DL — LOW (ref 7–23)
CALCIUM SERPL-MCNC: 9.2 MG/DL — SIGNIFICANT CHANGE UP (ref 8.4–10.5)
CAST: 0 /LPF — SIGNIFICANT CHANGE UP (ref 0–4)
CHLORIDE SERPL-SCNC: 105 MMOL/L — SIGNIFICANT CHANGE UP (ref 96–108)
CO2 SERPL-SCNC: 21 MMOL/L — LOW (ref 22–31)
COLOR SPEC: YELLOW — SIGNIFICANT CHANGE UP
CREAT ?TM UR-MCNC: 66 MG/DL — SIGNIFICANT CHANGE UP
CREAT ?TM UR-MCNC: 94 MG/DL — SIGNIFICANT CHANGE UP
CREAT SERPL-MCNC: 0.64 MG/DL — SIGNIFICANT CHANGE UP (ref 0.5–1.3)
DIFF PNL FLD: ABNORMAL
EGFR: 127 ML/MIN/1.73M2 — SIGNIFICANT CHANGE UP
EOSINOPHIL # BLD AUTO: 0.22 K/UL — SIGNIFICANT CHANGE UP (ref 0–0.5)
EOSINOPHIL NFR BLD AUTO: 1.8 % — SIGNIFICANT CHANGE UP (ref 0–6)
GLUCOSE SERPL-MCNC: 90 MG/DL — SIGNIFICANT CHANGE UP (ref 70–99)
GLUCOSE UR QL: NEGATIVE MG/DL — SIGNIFICANT CHANGE UP
HCT VFR BLD CALC: 28.3 % — LOW (ref 34.5–45)
HGB BLD-MCNC: 9.7 G/DL — LOW (ref 11.5–15.5)
IMM GRANULOCYTES NFR BLD AUTO: 0.9 % — SIGNIFICANT CHANGE UP (ref 0–0.9)
KETONES UR-MCNC: NEGATIVE MG/DL — SIGNIFICANT CHANGE UP
LDH SERPL L TO P-CCNC: 302 U/L — HIGH (ref 50–242)
LEUKOCYTE ESTERASE UR-ACNC: ABNORMAL
LYMPHOCYTES # BLD AUTO: 19.2 % — SIGNIFICANT CHANGE UP (ref 13–44)
LYMPHOCYTES # BLD AUTO: 2.39 K/UL — SIGNIFICANT CHANGE UP (ref 1–3.3)
MCHC RBC-ENTMCNC: 27.7 PG — SIGNIFICANT CHANGE UP (ref 27–34)
MCHC RBC-ENTMCNC: 34.3 GM/DL — SIGNIFICANT CHANGE UP (ref 32–36)
MCV RBC AUTO: 80.9 FL — SIGNIFICANT CHANGE UP (ref 80–100)
MONOCYTES # BLD AUTO: 0.86 K/UL — SIGNIFICANT CHANGE UP (ref 0–0.9)
MONOCYTES NFR BLD AUTO: 6.9 % — SIGNIFICANT CHANGE UP (ref 2–14)
NEUTROPHILS # BLD AUTO: 8.84 K/UL — HIGH (ref 1.8–7.4)
NEUTROPHILS NFR BLD AUTO: 71 % — SIGNIFICANT CHANGE UP (ref 43–77)
NITRITE UR-MCNC: NEGATIVE — SIGNIFICANT CHANGE UP
NRBC # BLD: 0 /100 WBCS — SIGNIFICANT CHANGE UP (ref 0–0)
PH UR: 7.5 — SIGNIFICANT CHANGE UP (ref 5–8)
PLATELET # BLD AUTO: 242 K/UL — SIGNIFICANT CHANGE UP (ref 150–400)
POTASSIUM SERPL-MCNC: 3.3 MMOL/L — LOW (ref 3.5–5.3)
POTASSIUM SERPL-SCNC: 3.3 MMOL/L — LOW (ref 3.5–5.3)
PROT ?TM UR-MCNC: 20 MG/DL — HIGH (ref 0–12)
PROT ?TM UR-MCNC: 24 MG/DL — HIGH (ref 0–12)
PROT SERPL-MCNC: 6.4 G/DL — SIGNIFICANT CHANGE UP (ref 6–8.3)
PROT UR-MCNC: SIGNIFICANT CHANGE UP MG/DL
PROT/CREAT UR-RTO: 0.3 RATIO — HIGH (ref 0–0.2)
PROT/CREAT UR-RTO: 0.3 RATIO — HIGH (ref 0–0.2)
RBC # BLD: 3.5 M/UL — LOW (ref 3.8–5.2)
RBC # FLD: 14.2 % — SIGNIFICANT CHANGE UP (ref 10.3–14.5)
RBC CASTS # UR COMP ASSIST: 14 /HPF — HIGH (ref 0–4)
RH IG SCN BLD-IMP: POSITIVE — SIGNIFICANT CHANGE UP
SODIUM SERPL-SCNC: 138 MMOL/L — SIGNIFICANT CHANGE UP (ref 135–145)
SP GR SPEC: 1.01 — SIGNIFICANT CHANGE UP (ref 1–1.03)
SQUAMOUS # UR AUTO: 25 /HPF — HIGH (ref 0–5)
URATE SERPL-MCNC: 4 MG/DL — SIGNIFICANT CHANGE UP (ref 2.5–7)
UROBILINOGEN FLD QL: 1 MG/DL — SIGNIFICANT CHANGE UP (ref 0.2–1)
WBC # BLD: 12.45 K/UL — HIGH (ref 3.8–10.5)
WBC # FLD AUTO: 12.45 K/UL — HIGH (ref 3.8–10.5)
WBC UR QL: 6 /HPF — HIGH (ref 0–5)

## 2024-08-23 PROCEDURE — G0463: CPT

## 2024-08-23 PROCEDURE — 86850 RBC ANTIBODY SCREEN: CPT

## 2024-08-23 PROCEDURE — 84156 ASSAY OF PROTEIN URINE: CPT

## 2024-08-23 PROCEDURE — 84550 ASSAY OF BLOOD/URIC ACID: CPT

## 2024-08-23 PROCEDURE — 59025 FETAL NON-STRESS TEST: CPT

## 2024-08-23 PROCEDURE — 80053 COMPREHEN METABOLIC PANEL: CPT

## 2024-08-23 PROCEDURE — 85025 COMPLETE CBC W/AUTO DIFF WBC: CPT

## 2024-08-23 PROCEDURE — 81003 URINALYSIS AUTO W/O SCOPE: CPT

## 2024-08-23 PROCEDURE — 83615 LACTATE (LD) (LDH) ENZYME: CPT

## 2024-08-23 PROCEDURE — 82570 ASSAY OF URINE CREATININE: CPT

## 2024-08-23 PROCEDURE — 81001 URINALYSIS AUTO W/SCOPE: CPT

## 2024-08-23 PROCEDURE — 99213 OFFICE O/P EST LOW 20 MIN: CPT

## 2024-08-23 PROCEDURE — 87653 STREP B DNA AMP PROBE: CPT

## 2024-08-23 PROCEDURE — 86901 BLOOD TYPING SEROLOGIC RH(D): CPT

## 2024-08-23 PROCEDURE — 86900 BLOOD TYPING SEROLOGIC ABO: CPT

## 2024-08-23 NOTE — OB PROVIDER TRIAGE NOTE - HISTORY OF PRESENT ILLNESS
24 year old  at 36w3d presents for evaluation of high blood pressure of 140/88 while at a routine prenatal appointment this morning. Of note patient was also seen in L&D triage 2 days ago on  for unrelated RLQ pain which has now resolved. At that time she was found to have elevated BPs in the 140s/80s. She had PEC labs on  which were wnl and P:C of 0.3. Patient denies HA, CP, SOB, RUQ pain, new onset swelling, N/V. She has otherwise been at her baseline state of health. She measures her BPs at home daily and has not had elevated BPs at home since .     Med hx: anemia  Med: PNV, ASA, iron  Surg hx: none  Allergies: none   No anxiety/depression

## 2024-08-23 NOTE — OB PROVIDER TRIAGE NOTE - NSOBPROVIDERNOTE_OBGYN_ALL_OB_FT
24 year old  at 36w3d presents for HELLP labs in setting of elevated BP readings in the office. Patient now with two BP readings >140/80 on two separate days. Now meets criteria for PEC given P/C of 0.3. BPs in triage today with some readings in the 140/80s but not meeting criteria for sPEC. Patient reports that her OB office already scheduled her for a 37 week induction due to her current PEC, this induction is scheduled for  at 6 pm. Patient discharged to home with return precautions for SR BPs or new onset PEC symptoms which were reviewed.     D/w Dr. Cabral, attending   Cici Vail, PGY1

## 2024-08-23 NOTE — OB PROVIDER TRIAGE NOTE - NSICDXPASTMEDICALHX_GEN_ALL_CORE_FT
PAST MEDICAL HISTORY:  Ovarian cyst      Complex Repair And Flap Additional Text (Will Appearing After The Standard Complex Repair Text): The complex repair was not sufficient to completely close the primary defect. The remaining additional defect was repaired with the flap mentioned below.

## 2024-08-23 NOTE — OB PROVIDER TRIAGE NOTE - NSHPLABSRESULTS_GEN_ALL_CORE
CBC Full  -  ( 23 Aug 2024 11:41 )  WBC Count : 12.45 K/uL  RBC Count : 3.50 M/uL  Hemoglobin : 9.7 g/dL  Hematocrit : 28.3 %  Platelet Count - Automated : 242 K/uL  Mean Cell Volume : 80.9 fl  Mean Cell Hemoglobin : 27.7 pg  Mean Cell Hemoglobin Concentration : 34.3 gm/dL  Auto Neutrophil # : 8.84 K/uL  Auto Lymphocyte # : 2.39 K/uL  Auto Monocyte # : 0.86 K/uL  Auto Eosinophil # : 0.22 K/uL  Auto Basophil # : 0.03 K/uL  Auto Neutrophil % : 71.0 %  Auto Lymphocyte % : 19.2 %  Auto Monocyte % : 6.9 %  Auto Eosinophil % : 1.8 %  Auto Basophil % : 0.2 %    08-23    138  |  105  |  6<L>  ----------------------------<  90  3.3<L>   |  21<L>  |  0.64    Ca    9.2      23 Aug 2024 11:41    TPro  6.4  /  Alb  3.0<L>  /  TBili  0.4  /  DBili  x   /  AST  12  /  ALT  9<L>  /  AlkPhos  144<H>  08-23

## 2024-08-23 NOTE — OB PROVIDER TRIAGE NOTE - NSHPPHYSICALEXAM_GEN_ALL_CORE
negative -  no rash
Gen: NAD  Pulm: breathing comfortably on room air  Abd: no tenderness to palpation

## 2024-08-24 LAB
GROUP B BETA STREP DNA (PCR): SIGNIFICANT CHANGE UP
SOURCE GROUP B STREP: SIGNIFICANT CHANGE UP

## 2024-08-27 ENCOUNTER — APPOINTMENT (OUTPATIENT)
Dept: OBGYN | Facility: CLINIC | Age: 24
End: 2024-08-27
Payer: MEDICAID

## 2024-08-27 ENCOUNTER — INPATIENT (INPATIENT)
Facility: HOSPITAL | Age: 24
LOS: 2 days | Discharge: HOME | DRG: 951 | End: 2024-08-30
Attending: OBSTETRICS & GYNECOLOGY | Admitting: OBSTETRICS & GYNECOLOGY
Payer: MEDICAID

## 2024-08-27 ENCOUNTER — TRANSCRIPTION ENCOUNTER (OUTPATIENT)
Age: 24
End: 2024-08-27

## 2024-08-27 ENCOUNTER — NON-APPOINTMENT (OUTPATIENT)
Age: 24
End: 2024-08-27

## 2024-08-27 VITALS — HEART RATE: 91 BPM | OXYGEN SATURATION: 98 %

## 2024-08-27 VITALS — SYSTOLIC BLOOD PRESSURE: 128 MMHG | WEIGHT: 170 LBS | DIASTOLIC BLOOD PRESSURE: 82 MMHG

## 2024-08-27 DIAGNOSIS — O99.283 ENDOCRINE, NUTRITIONAL AND METABOLIC DISEASES COMPLICATING PREGNANCY, THIRD TRIMESTER: ICD-10-CM

## 2024-08-27 DIAGNOSIS — O99.013 ANEMIA COMPLICATING PREGNANCY, THIRD TRIMESTER: ICD-10-CM

## 2024-08-27 DIAGNOSIS — N83.209 UNSPECIFIED OVARIAN CYST, UNSPECIFIED SIDE: ICD-10-CM

## 2024-08-27 DIAGNOSIS — Z3A.36 36 WEEKS GESTATION OF PREGNANCY: ICD-10-CM

## 2024-08-27 DIAGNOSIS — O34.83 MATERNAL CARE FOR OTHER ABNORMALITIES OF PELVIC ORGANS, THIRD TRIMESTER: ICD-10-CM

## 2024-08-27 DIAGNOSIS — R10.31 RIGHT LOWER QUADRANT PAIN: ICD-10-CM

## 2024-08-27 DIAGNOSIS — Z34.93 ENCOUNTER FOR SUPERVISION OF NORMAL PREGNANCY, UNSPECIFIED, THIRD TRIMESTER: ICD-10-CM

## 2024-08-27 DIAGNOSIS — O26.893 OTHER SPECIFIED PREGNANCY RELATED CONDITIONS, THIRD TRIMESTER: ICD-10-CM

## 2024-08-27 DIAGNOSIS — E28.2 POLYCYSTIC OVARIAN SYNDROME: ICD-10-CM

## 2024-08-27 DIAGNOSIS — O14.93 UNSPECIFIED PRE-ECLAMPSIA, THIRD TRIMESTER: ICD-10-CM

## 2024-08-27 DIAGNOSIS — O13.3 GESTATIONAL [PREGNANCY-INDUCED] HYPERTENSION WITHOUT SIGNIFICANT PROTEINURIA, THIRD TRIMESTER: ICD-10-CM

## 2024-08-27 DIAGNOSIS — D64.9 ANEMIA, UNSPECIFIED: ICD-10-CM

## 2024-08-27 LAB
ALBUMIN SERPL ELPH-MCNC: 3.1 G/DL — LOW (ref 3.3–5)
ALP SERPL-CCNC: 145 U/L — HIGH (ref 40–120)
ALT FLD-CCNC: 8 U/L — LOW (ref 10–45)
ANION GAP SERPL CALC-SCNC: 15 MMOL/L — SIGNIFICANT CHANGE UP (ref 5–17)
APPEARANCE UR: ABNORMAL
AST SERPL-CCNC: 12 U/L — SIGNIFICANT CHANGE UP (ref 10–40)
BACTERIA # UR AUTO: ABNORMAL /HPF
BASOPHILS # BLD AUTO: 0.05 K/UL — SIGNIFICANT CHANGE UP (ref 0–0.2)
BASOPHILS NFR BLD AUTO: 0.4 % — SIGNIFICANT CHANGE UP (ref 0–2)
BILIRUB SERPL-MCNC: 0.4 MG/DL — SIGNIFICANT CHANGE UP (ref 0.2–1.2)
BILIRUB UR-MCNC: NEGATIVE — SIGNIFICANT CHANGE UP
BLD GP AB SCN SERPL QL: NEGATIVE — SIGNIFICANT CHANGE UP
BUN SERPL-MCNC: 5 MG/DL — LOW (ref 7–23)
CALCIUM SERPL-MCNC: 9.3 MG/DL — SIGNIFICANT CHANGE UP (ref 8.4–10.5)
CAST: 3 /LPF — SIGNIFICANT CHANGE UP (ref 0–4)
CHLORIDE SERPL-SCNC: 104 MMOL/L — SIGNIFICANT CHANGE UP (ref 96–108)
CO2 SERPL-SCNC: 19 MMOL/L — LOW (ref 22–31)
COLOR SPEC: YELLOW — SIGNIFICANT CHANGE UP
CREAT ?TM UR-MCNC: 61 MG/DL — SIGNIFICANT CHANGE UP
CREAT SERPL-MCNC: 0.71 MG/DL — SIGNIFICANT CHANGE UP (ref 0.5–1.3)
DIFF PNL FLD: ABNORMAL
EGFR: 122 ML/MIN/1.73M2 — SIGNIFICANT CHANGE UP
EOSINOPHIL # BLD AUTO: 0.19 K/UL — SIGNIFICANT CHANGE UP (ref 0–0.5)
EOSINOPHIL NFR BLD AUTO: 1.6 % — SIGNIFICANT CHANGE UP (ref 0–6)
GLUCOSE SERPL-MCNC: 149 MG/DL — HIGH (ref 70–99)
GLUCOSE UR QL: NEGATIVE MG/DL — SIGNIFICANT CHANGE UP
HCT VFR BLD CALC: 29.5 % — LOW (ref 34.5–45)
HGB BLD-MCNC: 9.9 G/DL — LOW (ref 11.5–15.5)
IMM GRANULOCYTES NFR BLD AUTO: 1.1 % — HIGH (ref 0–0.9)
KETONES UR-MCNC: NEGATIVE MG/DL — SIGNIFICANT CHANGE UP
LDH SERPL L TO P-CCNC: 146 U/L — SIGNIFICANT CHANGE UP (ref 50–242)
LEUKOCYTE ESTERASE UR-ACNC: ABNORMAL
LYMPHOCYTES # BLD AUTO: 2.48 K/UL — SIGNIFICANT CHANGE UP (ref 1–3.3)
LYMPHOCYTES # BLD AUTO: 21.1 % — SIGNIFICANT CHANGE UP (ref 13–44)
MCHC RBC-ENTMCNC: 27.6 PG — SIGNIFICANT CHANGE UP (ref 27–34)
MCHC RBC-ENTMCNC: 33.6 GM/DL — SIGNIFICANT CHANGE UP (ref 32–36)
MCV RBC AUTO: 82.2 FL — SIGNIFICANT CHANGE UP (ref 80–100)
MONOCYTES # BLD AUTO: 0.73 K/UL — SIGNIFICANT CHANGE UP (ref 0–0.9)
MONOCYTES NFR BLD AUTO: 6.2 % — SIGNIFICANT CHANGE UP (ref 2–14)
NEUTROPHILS # BLD AUTO: 8.18 K/UL — HIGH (ref 1.8–7.4)
NEUTROPHILS NFR BLD AUTO: 69.6 % — SIGNIFICANT CHANGE UP (ref 43–77)
NITRITE UR-MCNC: NEGATIVE — SIGNIFICANT CHANGE UP
NRBC # BLD: 0 /100 WBCS — SIGNIFICANT CHANGE UP (ref 0–0)
PH UR: 7 — SIGNIFICANT CHANGE UP (ref 5–8)
PLATELET # BLD AUTO: 242 K/UL — SIGNIFICANT CHANGE UP (ref 150–400)
POTASSIUM SERPL-MCNC: 3 MMOL/L — LOW (ref 3.5–5.3)
POTASSIUM SERPL-SCNC: 3 MMOL/L — LOW (ref 3.5–5.3)
PROT ?TM UR-MCNC: 30 MG/DL — HIGH (ref 0–12)
PROT SERPL-MCNC: 6.5 G/DL — SIGNIFICANT CHANGE UP (ref 6–8.3)
PROT UR-MCNC: SIGNIFICANT CHANGE UP MG/DL
PROT/CREAT UR-RTO: 0.5 RATIO — HIGH (ref 0–0.2)
RBC # BLD: 3.59 M/UL — LOW (ref 3.8–5.2)
RBC # FLD: 14.2 % — SIGNIFICANT CHANGE UP (ref 10.3–14.5)
RBC CASTS # UR COMP ASSIST: 2 /HPF — SIGNIFICANT CHANGE UP (ref 0–4)
REVIEW: SIGNIFICANT CHANGE UP
RH IG SCN BLD-IMP: POSITIVE — SIGNIFICANT CHANGE UP
SODIUM SERPL-SCNC: 138 MMOL/L — SIGNIFICANT CHANGE UP (ref 135–145)
SP GR SPEC: 1.01 — SIGNIFICANT CHANGE UP (ref 1–1.03)
SQUAMOUS # UR AUTO: 34 /HPF — HIGH (ref 0–5)
URATE SERPL-MCNC: 4.9 MG/DL — SIGNIFICANT CHANGE UP (ref 2.5–7)
UROBILINOGEN FLD QL: 1 MG/DL — SIGNIFICANT CHANGE UP (ref 0.2–1)
WBC # BLD: 11.76 K/UL — HIGH (ref 3.8–10.5)
WBC # FLD AUTO: 11.76 K/UL — HIGH (ref 3.8–10.5)
WBC UR QL: 16 /HPF — HIGH (ref 0–5)

## 2024-08-27 PROCEDURE — 99213 OFFICE O/P EST LOW 20 MIN: CPT

## 2024-08-27 RX ORDER — OXYTOCIN 10 UNIT/ML
333.33 AMPUL (ML) INJECTION
Qty: 20 | Refills: 0 | Status: COMPLETED | OUTPATIENT
Start: 2024-08-27 | End: 2024-08-27

## 2024-08-27 RX ORDER — SODIUM CHLORIDE 9 MG/ML
1000 INJECTION INTRAMUSCULAR; INTRAVENOUS; SUBCUTANEOUS
Refills: 0 | Status: DISCONTINUED | OUTPATIENT
Start: 2024-08-27 | End: 2024-08-29

## 2024-08-27 RX ORDER — SODIUM CHLORIDE 9 MG/ML
300 INJECTION INTRAMUSCULAR; INTRAVENOUS; SUBCUTANEOUS ONCE
Refills: 0 | Status: COMPLETED | OUTPATIENT
Start: 2024-08-27 | End: 2024-08-27

## 2024-08-27 RX ORDER — OXYTOCIN 10 UNIT/ML
AMPUL (ML) INJECTION
Qty: 30 | Refills: 0 | Status: DISCONTINUED | OUTPATIENT
Start: 2024-08-27 | End: 2024-08-28

## 2024-08-27 RX ORDER — CHLORHEXIDINE GLUCONATE 40 MG/ML
1 SOLUTION TOPICAL DAILY
Refills: 0 | Status: DISCONTINUED | OUTPATIENT
Start: 2024-08-27 | End: 2024-08-28

## 2024-08-27 RX ORDER — SODIUM CITRATE AND CITRIC ACID MONOHYDRATE 334; 500 MG/5ML; MG/5ML
15 SOLUTION ORAL EVERY 6 HOURS
Refills: 0 | Status: DISCONTINUED | OUTPATIENT
Start: 2024-08-27 | End: 2024-08-28

## 2024-08-27 RX ADMIN — Medication 2 MILLIUNIT(S)/MIN: at 23:48

## 2024-08-27 RX ADMIN — Medication 125 MILLILITER(S): at 19:56

## 2024-08-27 RX ADMIN — SODIUM CHLORIDE 300 MILLILITER(S): 9 INJECTION INTRAMUSCULAR; INTRAVENOUS; SUBCUTANEOUS at 21:21

## 2024-08-27 RX ADMIN — Medication 0.25 MILLIGRAM(S): at 22:24

## 2024-08-27 NOTE — OB RN PATIENT PROFILE - AVIAN FLU WORK
MEDICAL ONCOLOGY - ESTABLISHED PATIENT    Reason for visit: Gastric cancer     Best Contact Phone Number(s): [phone] (home)      Cancer/Stage/TNM:   Cancer Staging  No matching staging information was found for the patient.     Oncology History   Malignant neoplasm of stomach   6/10/2021 Initial Diagnosis    Gastric adenocarcinoma     7/26/2021 - 4/6/2022 Chemotherapy    Treatment Summary   Plan Name: OP FOLFOX 6 Q2W  Treatment Goal: Palliative  Status: Inactive  Start Date: 7/26/2021  End Date: 4/6/2022  Provider: Fer Ashraf MD  Chemotherapy: fluorouraciL 2,400 mg/m2 = 3,770 mg in sodium chloride 0.9% 100 mL chemo infusion, 2,400 mg/m2 = 3,770 mg, Intravenous, Over 46 hours, 19 of 20 cycles  Administration: 3,770 mg (7/26/2021), 3,770 mg (8/9/2021), 3,770 mg (8/23/2021), 3,770 mg (9/7/2021), 3,770 mg (9/21/2021), 3,770 mg (10/5/2021), 3,770 mg (10/19/2021), 3,770 mg (11/2/2021), 3,770 mg (11/15/2021), 3,770 mg (11/30/2021), 3,770 mg (12/13/2021), 4,000 mg (12/27/2021), 4,030 mg (1/10/2022), 4,030 mg (1/24/2022), 4,000 mg (2/7/2022), 4,000 mg (2/22/2022), 4,000 mg (3/7/2022), 4,000 mg (3/22/2022), 4,000 mg (4/4/2022)  oxaliplatin (ELOXATIN) 85 mg/m2 = 133 mg in dextrose 5 % 500 mL chemo infusion, 85 mg/m2 = 133 mg, Intravenous, [facility] [place] [facility] time, 9 of 9 cycles  Administration: 133 mg (7/26/2021), 133 mg (8/9/2021), 133 mg (8/23/2021), 133 mg (9/7/2021), 133 mg (9/21/2021), 133 mg (10/5/2021), 133 mg (10/19/2021), 133 mg (11/2/2021), 133 mg (11/15/2021)     5/26/2022 -  Chemotherapy    Treatment Summary   Plan Name: OP FOLFIRI Q2WK  Treatment Goal: Palliative  Status: Active  Start Date: 5/26/2022  End Date: 4/19/2023 (Planned)  Provider: Fer Ashraf MD  Chemotherapy: dexAMETHasone (DECADRON) 4 MG Tab, 8 mg, Oral, Daily, 1 of 1 cycle, Start date: --, End date: --  irinotecan (CAMPTOSAR) 120 mg/m2 = 200 mg in sodium chloride 0.9% 500 mL chemo infusion, 120 mg/m2 = 200 mg (100 % of original dose  120 mg/m2), Intravenous, Clinic/[facility] 1 time, 3 of 24 cycles  Dose modification: 125 mg/m2 (original dose 120 mg/m2, Cycle 1), 120 mg/m2 (original dose 120 mg/m2, Cycle 1), 75 mg/m2 (original dose 120 mg/m2, Cycle 2, Reason: Dose not tolerated)  Administration: 200 mg (5/26/2022), 126 mg (6/13/2022), 126 mg (6/27/2022)  ramucirumab (CYRAMZA) 471 mg in sodium chloride 0.9% 250 mL chemo infusion, 8 mg/kg = 471 mg, Intravenous, Clinic/[facility] 1 time, 1 of 1 cycle  Administration: 471 mg (5/26/2022)     Anxiety associated with cancer diagnosis   12/27/2021 Initial Diagnosis    Anxiety associated with cancer diagnosis          Interim History:   Puja is a very pleasant 40 y.o. female with metastatic gastric cancer who presents for follow-up prior to cycle 4 of FOLFIRI.  She experienced syncopal episodes x 2 during her MRI a couple weeks ago which prompted a hospitalization for pain control.  Etiology was a bit unclear but may have been due to tumor compression on her IVC from prolonged supine position on hard surface.  She has felt ok since then.  Tolerance of chemotherapy has been fine.  She does have significant LE edema that has worsened since her hospitalization. Continues with weekly paracenteses which provides some relief.    ECOG status is 1.     Review of Systems   Constitutional: Positive for appetite change. Negative for activity change, chills, fatigue, fever and unexpected weight change.   HENT: Negative for ear pain, facial swelling, hearing loss, mouth sores, nosebleeds, sore throat and trouble swallowing.    Eyes: Negative for pain, discharge, redness and visual disturbance.   Respiratory: Negative for cough, chest tightness and shortness of breath.    Cardiovascular: Positive for leg swelling. Negative for chest pain and palpitations.   Gastrointestinal: Positive for abdominal distention, nausea, vomiting and reflux. Negative for abdominal pain, blood in stool, constipation and diarrhea.   Endocrine: Negative  for cold intolerance and heat intolerance.   Genitourinary: Negative for decreased urine volume, difficulty urinating, dysuria, frequency, hematuria, hot flashes and urgency.   Musculoskeletal: Negative for arthralgias, gait problem, leg pain and myalgias.   Integumentary:  Negative for pallor, rash and wound.   Allergic/Immunologic: Negative for immunocompromised state.   Neurological: Positive for numbness (slowly improving). Negative for dizziness, tremors, weakness, light-headedness and headaches.   Hematological: Negative for adenopathy. Does not bruise/bleed easily.   Psychiatric/Behavioral: Negative for agitation, confusion, dysphoric mood and sleep disturbance. The patient is not nervous/anxious.            Past Medical History:   Past Medical History:   Diagnosis Date    Anxiety     Dehydration 5/30/2022    Gastric cancer     Gastric ulcer     Hx of psychiatric care     Pregnancy 08/12/2020    delivered on 8/12/2020    Umbilical hernia         Past Surgical History:   Past Surgical History:   Procedure Laterality Date    CLOSURE OF PERFORATED ULCER OF DUODENUM USING OMENTAL PATCH      ESOPHAGOGASTRODUODENOSCOPY N/A 10/13/2020    Procedure: EGD (ESOPHAGOGASTRODUODENOSCOPY);  Surgeon: Rosio Marion MD;  Location: [address]);  Service: Endoscopy;  Laterality: N/A;    ESOPHAGOGASTRODUODENOSCOPY N/A 12/11/2020    Procedure: EGD (ESOPHAGOGASTRODUODENOSCOPY);  Surgeon: Rosio Marion MD;  Location: [address]);  Service: Endoscopy;  Laterality: N/A;  Covid-19 test 12/8/20 at [facility]    ESOPHAGOGASTRODUODENOSCOPY N/A 3/12/2021    Procedure: EGD (ESOPHAGOGASTRODUODENOSCOPY);  Surgeon: Nav Omer MD;  [facility]  Service: Endoscopy;  Laterality: N/A;  COVID at [facility] 3/9 [facility]    ESOPHAGOGASTRODUODENOSCOPY N/A 5/18/2021    Procedure: EGD (ESOPHAGOGASTRODUODENOSCOPY);  Surgeon: Rosio Marion MD;  [facility]  Service:  "Endoscopy;  Laterality: N/A;  5/15-covid pcw-inst portal-tb    ESOPHAGOGASTRODUODENOSCOPY N/A 2021    Procedure: EGD (ESOPHAGOGASTRODUODENOSCOPY);  Surgeon: Socrates Terrazas MD;  Location: [facility] ([address] [place] [address]);  Service: Endoscopy;  Laterality: N/A;        Family History:   Family History   Problem Relation Age of Onset    Cancer Mother 67        lymphoma (type? "not active," not on tx; "related to her blood")    Schizophrenia Mother     Lung cancer Father 48        type? h/o smoking    No Known Problems Son     Breast cancer Other 73        unilat; stage I, but aggressive    Prostate cancer Paternal Uncle         (dx 50s/60? no chemo?)    Breast cancer Paternal Cousin         (dx age?) ductal    Colon cancer Neg Hx     Esophageal cancer Neg Hx         Social History:   Social History     Tobacco Use    Smoking status: Former Smoker     Types: Cigarettes     Quit date: 2019     Years since quittin.6    Smokeless tobacco: Never Used   Substance Use Topics    Alcohol use: Yes        I have reviewed and updated the patient's past medical, surgical, family and social histories.    Allergies:   Review of patient's allergies indicates:  No Known Allergies     Medications:   Current Outpatient Medications   Medication Sig Dispense Refill    baclofen (LIORESAL) 5 mg Tab tablet Take 1 tablet (5 mg total) by mouth 3 (three) times daily. 30 tablet 0    dexAMETHasone (DECADRON) 4 MG Tab 8 mg daily on days 2 through 4 of each chemotherapy cycle. 60 tablet 2    dicyclomine (BENTYL) 10 MG capsule TAKE ONE CAPSULE BY MOUTH FOUR TIMES DAILY 120 capsule 0    famotidine (PEPCID) 20 MG tablet Take 1 tablet (20 mg total) by mouth nightly as needed for Heartburn. 60 tablet 1    furosemide (LASIX) 20 MG tablet Take 10 mg by mouth.      gabapentin (NEURONTIN) 300 MG capsule Take 1 capsule (300 mg total) by mouth 3 (three) times daily. 90 capsule 11    LIDOcaine HCL 2% (XYLOCAINE) 2 % jelly Apply topically as " "needed. 100 mL 0    LIDOcaine-prilocaine (EMLA) cream Apply to Port-A-Cath area 30 to 45 minutes prior to port access as directed (topical anesthetic use to the anterior chest only).      methadone (DOLOPHINE) 5 mg/5 mL solution Take 2.5 mLs (2.5 mg total) by mouth every 12 (twelve) hours. 75 mL 0    metoclopramide HCl (REGLAN) 5 MG tablet 5 mg.      multivitamin with folic acid 400 mcg Tab Take 1 tablet by mouth once daily.      nortriptyline (PAMELOR) 25 MG capsule Take 1 capsule (25 mg total) by mouth every evening. 30 capsule 11    omeprazole (PRILOSEC) 40 MG capsule Take 1 capsule (40 mg total) by mouth 2 (two) times daily before meals. 60 capsule 11    ondansetron (ZOFRAN) 8 MG tablet Take 1 tablet (8 mg total) by mouth every 8 (eight) hours as needed for Nausea. 60 tablet 2    oxyCODONE (ROXICODONE) 5 MG immediate release tablet Take 1 tablet (5 mg total) by mouth every 6 (six) hours as needed. 60 tablet 0    polyethylene glycol (GLYCOLAX) 17 gram/dose powder Mix 1 capful (17 g) with liquid and take by mouth once daily for 20 days 510 g 0    prochlorperazine (COMPAZINE) 10 MG tablet TAKE 1 TABLET(10 MG) BY MOUTH EVERY 6 HOURS AS NEEDED FOR NAUSEA 90 tablet 2    promethazine (PHENERGAN) 25 MG tablet Take 1 tablet (25 mg total) by mouth every 6 (six) hours as needed for Nausea. 60 tablet 2    senna-docusate 8.6-50 mg (SENNA WITH DOCUSATE SODIUM) 8.6-50 mg per tablet Take 1 tablet by mouth 2 (two) times daily as needed for Constipation.      sucralfate (CARAFATE) 1 gram tablet TAKE 1 TABLET(1 GRAM) BY MOUTH FOUR TIMES DAILY FOR 14 DAYS 56 tablet 0    sucralfate (CARAFATE) 1 gram tablet       traMADoL (ULTRAM) 50 mg tablet Take 50 mg by mouth.       No current facility-administered medications for this visit.        Physical Exam:   /84 (BP Location: Left arm, Patient Position: Sitting, BP Method: Small (Automatic))   Pulse 77   Temp 98.6 °F (37 °C) (Oral)   Resp 18   Ht 5' 7" (1.702 m)  "  Wt 67.8 kg (149 lb 7.6 oz)   SpO2 100%   BMI 23.41 kg/m²      ECOG Performance status: 2            Physical Exam  Constitutional:       Comments: Physical exam was not performed as visit was completed by audio only             Labs:   Recent Results (from the past 48 hour(s))   CBC Oncology    Collection Time: 07/06/22  2:39 PM   Result Value Ref Range    WBC 6.61 3.90 - 12.70 K/uL    RBC 3.37 (L) 4.00 - 5.40 M/uL    Hemoglobin 9.1 (L) 12.0 - 16.0 g/dL    Hematocrit 29.3 (L) 37.0 - 48.5 %    MCV 87 82 - 98 fL    MCH 27.0 27.0 - 31.0 pg    MCHC 31.1 (L) 32.0 - 36.0 g/dL    RDW 14.5 11.5 - 14.5 %    Platelets 356 150 - 450 K/uL    MPV 8.5 (L) 9.2 - 12.9 fL    Gran # (ANC) 4.9 1.8 - 7.7 K/uL    Immature Grans (Abs) 0.03 0.00 - 0.04 K/uL   Comprehensive Metabolic Panel    Collection Time: 07/06/22  2:39 PM   Result Value Ref Range    Sodium 138 136 - 145 mmol/L    Potassium 3.7 3.5 - 5.1 mmol/L    Chloride 105 95 - 110 mmol/L    CO2 28 23 - 29 mmol/L    Glucose 91 70 - 110 mg/dL    BUN 9 6 - 20 mg/dL    Creatinine 0.7 0.5 - 1.4 mg/dL    Calcium 8.7 8.7 - 10.5 mg/dL    Total Protein 5.5 (L) 6.0 - 8.4 g/dL    Albumin 1.8 (L) 3.5 - 5.2 g/dL    Total Bilirubin 0.2 0.1 - 1.0 mg/dL    Alkaline Phosphatase 81 55 - 135 U/L    AST 17 10 - 40 U/L    ALT 8 (L) 10 - 44 U/L    Anion Gap 5 (L) 8 - 16 mmol/L    eGFR if African American >60.0 >60 mL/min/1.73 m^2    eGFR if non African American >60.0 >60 mL/min/1.73 m^2        I have reviewed the pertinent labs from today which show hgb 9.1, albumin 1.8.    Imaging:    CT chest: 6/6/2022  Impression:     Interval development of a large left pleural effusion and ascites with associated left lower lobe compressive atelectasis.     MRI abdomen/pelvis: 6/16/2022  Impression:     1. Diffuse irregular enhancing gastric wall thickening in keeping with known biopsy-proven gastric adenocarcinoma.  Gastric antrum partially obscured by susceptibility artifact.  2. Significantly increased size  of a large mixed cystic and solid heterogeneously enhancing pelvic mass.  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  Lesion demonstrates significant mass effect within the pelvis and lower abdomen with obscuration of surrounding structures.  3. Moderate volume abdominopelvic ascites.  Diffuse peritoneal thickening and enhancement concerning for peritoneal carcinomatosis.  4. Numerous small hepatic cysts.  5. Moderate left pleural effusion.  6. Diffuse body wall edema.  7. Small nonspecific enhancing lesion within the right femoral head corresponding with a peripherally sclerotic lucent focus identified on prior CT.  Favor a degenerative subchondral cyst however cannot entirely exclude metastatic disease.  Attention on follow-up    Path:   5/26/21:  Positive for malignancy.   Poorly differentiated adenocarcinoma growing with signet ring features   Immunostains for Bruce-EP4 and CEA are positive.  The controls stain   appropriately.   This case was reviewed by Dr Sebastian who concurs with the diagnosis       Assessment:       No diagnosis found.      Plan:             # Gastric adenocarcinoma with peritoneal metastases, immunodeficiency due to chemotherapy, bone metastases  HER-2 negative by FISH.  PD-L1 < 1%.  Her cytology from her ascites and EGD and CT findings confirmed metastatic gastric adenocarcinoma to the peritoneum.  We previously explained the implications of a stage IV diagnosis to her and potential treatment options.  She is now s/p port placement. She sought a second opinion at [facility] for zolbetuximab trial targeting CLDN protein but she did not test positive for this biomarker. We recommended proceeding with SOC FOLFOX, with which the [facility] team agreed. Because of the negative PD-L1 I do not think the benefit of adding nivolumab outweighs the potential toxicities.       Her [employer] 360 testing demonstrated an SAMUEL 3008H mutation (likely  germline), CTNNB1 W383C, SAMUEL splice site SNV, FGFR2 amplification, CDH1 L436fs.  She opted not to get genetic testing done at her appt with Phi. 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      CT CAP after 6 cycles showed improvement in ascites, probable hepatic, thyroid and bone metastases. We dropped oxaliplatin starting with cycle 10 due to grade 1 neuropathy and desire to keep it from worsening.    CT CAP after cycle 10 showed stable disease.  CT scan after cycle 15 continued to display overall stability of disease within the gastric wall, peritoneum, spine and liver. Although increase ascites on imaging and clinically can represent progression of disease, her previous CT suggested overall stability within the liver and gastric wall, so we recommended to continue with treatment time. However, she continued to have increasing abdominal ascites that has been concerning for progression of disease. Hence, she had repeat imaging. On CT performed on 4/14/2022, she appeared to have worsening disease suggestive of progression. She was seen by Dr. Reid at [facility] for f/u and to discuss possible treatment options.  She was also evaluated by Dr. Andree Mueller and Dr. See of Surgical Oncology at [facility].  Ultimately she was not felt to be a good surgical debulking candidate due to her ascites.  If her ascites improves during the course of chemotherapy and her performance status remains stable or improves, they would re-consider her again for palliative oophorectomy.     She was started on second-line FOLFIRI. Was given ramucirumab with cycle 1 but this later will be held given [facility] recs for just FOLFIRI.  She didn't tolerate cycle 1 well with significant N/V.  Will dose reduce irinotecan to 75 mg/m2 - has UGT1A1 homozygous mutation indicating poor metabolism.  Continue 5-FU infusion at 2200 mg/m2.    After an in-depth discussion,  she opted to pursue 2nd line chemotherapy. We favored use of FOLFIRI as second-line rather than Taxol/Claudio due to ongoing neuropathy. Pt was agreeable.   Tolerance has been fair.  Will proceed with cycle 4 of  Chemotherapy next week. Will continue with reduced dose of the irinotecan 75 mg/m2.   -RTC in 2 weeks for cycle 5. Will repeat imaging studies as scheduled at [facility] after cycle 5.  - We discussed the potential pros and cons of debulking surgery if this is offered at [facility] pending the results of her upcoming scan.    #Ascites, lower extremity edema, weight loss, malnutrition  Initially had peritoneal catheter after diagnosis, output slowed so was removed in October 2021 by IR.  Will continue with weekly paracentesis. Has increased edema of the lower extremity that is most likely due to malnutrition, hypoalbuminemia; perhaps some impaired venous drainage due to compression from tumor.   Will trial Lasix 20 mg daily.  Close monitoring of BP and any symptoms of dehydration.     # Nausea, gastric ulcer  Continue dex 8 mg on days 2-4 of chemotherapy.  Continue Phenergan as needed. Has Zofran and compazine also PRN.  Continue Protonix.      # Anemia  Hgb below baseline but now stabilized. Will continue to monitor.  Prior iron deficiency due to chronic blood loss.  S/p 2 doses of Injectafer.    # Anxiety  Continue f/u with psych onc w/ Dr. Berumen    # chemotherapy induced neuropathy, pain  2/2 previous chemotherapy with Oxaliplatin.  Continue acupuncture.  Continue gabapentin.  Will continue to monitor.   Continue palliative care f/u.    Follow up: RTC in 2 weeks for lab work, clinic visit and cycle 5 of FOLFIRI with pump d/c on day 3.       Route Chart for Scheduling    Med Onc Chart Routing      Follow up with physician 2 weeks. Cycle 5 of [facility]FIRI [facility].    Follow up with ISHMAEL    Infusion scheduling note    Injection scheduling note    Labs CBC and CMP   Lab interval: every 2 weeks     Imaging     Pharmacy appointment    Other referrals          Treatment Plan Information   OP FOLFIRI Q2WK   Fer Ashraf MD   Upcoming Treatment Dates - OP FOLFIRI Q2WK    7/11/2022       Pre-Medications       diphenhydrAMINE (BENADRYL) 50 mg in sodium chloride 0.9% 50 mL IVPB       Chemotherapy       irinotecan (CAMPTOSAR) 75 mg/m2 = 126 mg in sodium chloride 0.9% 500 mL chemo infusion       fluorouracil (ADRUCIL) 2,200 mg/m2 = 3,675 mg in sodium chloride 0.9% 100 mL chemo infusion       Supportive Care       atropine injection 0.4 mg       Antiemetics       palonosetron (ALOXI) 0.25 mg with Dexamethasone (DECADRON) 12 mg in NS 50 mL IVPB  7/25/2022       Pre-Medications       diphenhydrAMINE (BENADRYL) 50 mg in sodium chloride 0.9% 50 mL IVPB       Chemotherapy       irinotecan (CAMPTOSAR) 75 mg/m2 = 126 mg in sodium chloride 0.9% 500 mL chemo infusion       fluorouracil (ADRUCIL) 2,200 mg/m2 = 3,675 mg in sodium chloride 0.9% 100 mL chemo infusion       Supportive Care       atropine injection 0.4 mg       Antiemetics       palonosetron (ALOXI) 0.25 mg with Dexamethasone (DECADRON) 12 mg in NS 50 mL IVPB  8/8/2022       Pre-Medications       diphenhydrAMINE (BENADRYL) 50 mg in sodium chloride 0.9% 50 mL IVPB       Chemotherapy       irinotecan (CAMPTOSAR) 75 mg/m2 = 126 mg in sodium chloride 0.9% 500 mL chemo infusion       fluorouracil (ADRUCIL) 2,200 mg/m2 = 3,675 mg in sodium chloride 0.9% 100 mL chemo infusion       Supportive Care       atropine injection 0.4 mg       Antiemetics       palonosetron (ALOXI) 0.25 mg with Dexamethasone (DECADRON) 12 mg in NS 50 mL IVPB  8/22/2022       Pre-Medications       diphenhydrAMINE (BENADRYL) 50 mg in sodium chloride 0.9% 50 mL IVPB       Chemotherapy       irinotecan (CAMPTOSAR) 75 mg/m2 = 126 mg in sodium chloride 0.9% 500 mL chemo infusion       fluorouracil (ADRUCIL) 2,200 mg/m2 = 3,675 mg in sodium chloride 0.9% 100 mL chemo infusion       Supportive Care        atropine injection 0.4 mg       Antiemetics       palonosetron (ALOXI) 0.25 mg with Dexamethasone (DECADRON) 12 mg in NS 50 mL IVPB    Supportive Plan Information  IV FLUIDS AND ELECTROLYTES   aKmille Hooks PA-C   Upcoming Treatment Dates - IV FLUIDS AND ELECTROLYTES    No upcoming days in selected categories.    Therapy Plan Information  EPINEPHrine (EPIPEN) 0.3 mg/0.3 mL pen injection 0.3 mg  0.3 mg, Intramuscular, PRN  diphenhydrAMINE injection 50 mg  50 mg, Intravenous, PRN  methylPREDNISolone sodium succinate injection 125 mg  125 mg, Intravenous, PRN  sodium chloride 0.9% bolus 1,000 mL  1,000 mL, Intravenous, PRN             No

## 2024-08-27 NOTE — OB PROVIDER H&P - NSHPPHYSICALEXAM_GEN_ALL_CORE
T(C): 36.8 (08-27-24 @ 19:27), Max: 36.8 (08-27-24 @ 19:02)  HR: 78 (08-27-24 @ 19:35) (76 - 91)  BP: 132/71 (08-27-24 @ 19:34) (132/71 - 151/84)  RR: 16 (08-27-24 @ 19:27) (16 - 16)  SpO2: 97% (08-27-24 @ 19:35) (94% - 98%)  Gen: NAD, well-appearing   Abd: Soft, gravid  SVE: 1/0/-3  Bedside sono: vertex  FHT: baseline 145 bpm, moderate variability, +Accels, -decels  [place]: q7 min

## 2024-08-27 NOTE — PRE-ANESTHESIA EVALUATION ADULT - NSANTHPMHFT_GEN_ALL_CORE
37 weeks gestation; gestational HTN; pre-eclampsia; anemia; PCOS; h/o vasovagal syncope w/o loss of consciousness and palpitations with a nl EKG in sinus and cardiology w/up negative at 14 weeks of pregnancy

## 2024-08-27 NOTE — OB PROVIDER H&P - HIV: DATE, OB PROFILE
R knee not assessed due to surgery/bilateral upper extremity ROM was WFL (within functional limits)/bilateral lower extremity ROM was WFL (within functional limits)
08-Jan-2024

## 2024-08-27 NOTE — OB PROVIDER H&P - ATTENDING COMMENTS
/Attendiny/o  @37wk admitted for scheduled IOL 2nd to PEC w/o severe features.  Pt was discussed with me; chart reviewed; pt seen at bedside and I agree with the above Resident's assessment and plan.    Pt w/ PNC in LRC and was complicated by gHTN w/ mild range BP's and then met criteria PEC w/o severe features as P/C is 0.3.  Pt denies any s/sx of PEC at this time.  PNC also complicated by anemia and was on PO Iron.    Prenatal labs reviewed:   Blood Type:  O+; GBS: neg; HepB sAg:  neg; HIV: neg;; RPR: neg    VSS, afebrile  Pt denies any complaints.  EFW: 2900gms  FHT: BL: 145bpm; Cat-1  Bergoo: Occ contractions (no felt by pt)  SVE: 1/0/-3 @7:30pm  Memb: Intact    Labs:  H/H: 9.9/29.5; Plt: 242    A/P  -25y/o  @37wk admitted for scheduled IOL 2nd to PEC w/o severe features.  -Consent for management on L&D and for delivery obtained after R/B/A reviewed and all questions answered and pt voiced understanding.  -Pt w/ unfavorable cervix and will ripen w/ vaginal Cytotec and a cervical balloon.  -Anesthesia consult for pain management when desires  -Pt w/ anemia w/ HCT <30 and and was on ASA, so pt is a severe PPH risk and will have 2 large bore IV's placed and 2units on hold and will get a 2nd uterotonic after delivery of the placenta for PPH prophylaxis.  -SCD's while in bed for DVT prophylaxis.   -Anticipate vaginal delivery at this time    Dr. Sloan

## 2024-08-27 NOTE — OB PROVIDER H&P - HISTORY OF PRESENT ILLNESS
24y  at 37.0 weeks GA presents to L&D for induction of labor for elevated blood pressures. Patient met criteria for preeclampsia recently with mild range BPs and P/C 0.3. Blood pressures at home have been in 130s/80s. Patient denies vaginal bleeding, contractions and leakage of fluid. She endorses good fetal movement. Denies fevers, chills, nausea and vomiting. No other complaints at this time. Prenatal course is significant for: newly diagnosed PEC (HELLP labs wnl, P/C 0.3)  MAJOR: 2024    POB: PEC (HELLP labs wnl, P/C 0.3)  PGYN: PCOS; small bilateral ovarian cysts 1cm; denies fibroids, STD hx, or abnormal PAPs   PMH: anemia  PSH: Denies  SH: Denies EtOH, tobacco and illicit drug use during this pregnancy; feels safe at home   Psych Hx: denies hx of anxiety or depression  Meds: PNVs, bASA, iron  Allergies: NKDA    EFW: 2900 (extrapolated from sono  of 2300g)    GBS: negative

## 2024-08-27 NOTE — OB RN PATIENT PROFILE - NS_RSVSEASON_OBGYN_ALL_OB
Left message for pt's daughter to give the office a call back.      Results: Ectasia of the distal abdominal aorta measuring 2.4 cm in diameter.   No

## 2024-08-27 NOTE — OB RN PATIENT PROFILE - NSSTATEDREASONFORADM_OBGYN_A_OB_FT
[facility], [place]. [address] [facility]  [phone] office  [phone] fax      OFFICE VISIT:  2022    Cali Lao - : 1949  Reason For Visit:  Agatha Shin is a 71 y.o. male who is here for 6 Month Follow-Up, Coronary Artery Disease, and Cardiomyopathy    History:   Diagnosis Orders   1. Coronary artery disease involving native coronary artery of native heart without angina pectoris     2. Chronic combined systolic and diastolic heart failure ([address]     3. Hx of CABG     4. S/P ablation of atrial fibrillation     5. Sick sinus syndrome ([facility]     6. Essential hypertension     7. Mixed hyperlipidemia     8. AICD (automatic cardioverter/defibrillator) present       The patient presents today for cardiology follow up. Mr. Yamile Lance is doing very well. He has plans for 10 day vacation to [place] to celebrate  anniversary. The patient reports has PAD LLE. His PCP ordered an arterial scan for tomorrow. The patient denies symptoms to suggest myocardial ischemia, heart failure or arrhythmia. BP is well controlled on current regimen. The patient's PCP monitors cholesterol. Subjective  Mc Roes E denies exertional chest pain, shortness of breath, orthopnea, paroxysmal nocturnal dyspnea, syncope, presyncope, sensed arrhythmia, edema and fatigue. The patient denies numbness or weakness to suggest cerebrovascular accident or transient ischemic attack.      Cali Lao has the following history as recorded in [facility]:  Patient Active Problem List   Diagnosis Code    Coronary artery disease involving native coronary artery of native heart I25.10    Mixed hyperlipidemia E78.2    S/P ablation of atrial fibrillation Z98.890, Z86.79    History of amiodarone therapy Z92.29    Ex-smoker Z87.891    Chest pain R07.9    Paroxysmal atrial fibrillation (HCC) I48.0    Chronic combined systolic and diastolic heart failure (HCC) I50.42    Sick sinus syndrome (HCC) I49.5    Amaurosis fugax G45.3    Essential hypertension I10    Chronic anticoagulation Z79.01    Simple chronic bronchitis (HCC) J41.0    Type 2 diabetes mellitus without complication, without long-term current use of insulin (HCC) E11.9    Obesity (BMI 30.0-34. 9) E66.9    Hx of CABG Z95.1    PSVT (paroxysmal supraventricular tachycardia) (HCC) I47.1    PAC (premature atrial contraction) I49.1    Dizziness R42    Ischemic cardiomyopathy I25.5     Past Medical History:   Diagnosis Date    Atrial fibrillation (HCC)     Atrial flutter (HCC)     CAD (coronary artery disease)     CAD (coronary atherosclerotic disease)     Diabetes mellitus ([address]     diet controlled    Ex-smoker     quit 2013 /smoked 50 yrs    History of amiodarone therapy     [facility]lipidemia     [facility]      Hypertension     Hypokalemia     Hypotension     MI (myocardial infarction) ([address]     S/P CABG x 4 11/11/2013 6/27/13    Stroke ([facility] [address] 08/22/2017    eye     Past Surgical History:   Procedure Laterality Date    ABLATION OF DYSRHYTHMIC FOCUS      CARDIAC CATHETERIZATION  6/27/13  MDL    EF 45%    CARDIAC SURGERY      CABG x 4    CATARACT REMOVAL WITH IMPLANT Bilateral     COLONOSCOPY N/A 6/16/2020    Dr Des Husain x 1, AP x 1, 5 yr recall    CORONARY ARTERY BYPASS GRAFT  6/27/2013     Emergency CABG x 4, LIMA-DIAG, SVG-LAD, SVG-OM, SVG-PDA, RT EVH, DR DOLAN    CYST INCISION AND DRAINAGE N/A     RECTAL    EYE SURGERY      EYE SURGERY      cataract sx and implants (both eyes)    OTHER SURGICAL HISTORY      heart ablation    RETROPHYARYNGEAL ABCESS INCISION/DRAIN      Abcess near rectum     Family History   Problem Relation Age of Onset    Stroke Father     Heart Disease Father     High Blood Pressure Father     High Cholesterol Father     Other Father         \"swelling on vein behind stomach\"    Heart Disease Other     Colon Cancer Neg Hx     Colon Polyps Neg Hx     Esophageal Cancer Neg Hx     Liver Cancer Neg Hx     Liver Disease Neg Hx     Rectal Cancer Neg Hx     Stomach Cancer Neg Hx      Social History     Tobacco Use    Smoking status: Former Smoker     Packs/day: 1.00     Years: 15.00     Pack years: 15.00     Types: Cigarettes     Quit date: 2013     Years since quittin.7    Smokeless tobacco: Never Used   Substance Use Topics    Alcohol use: No      Current Outpatient Medications   Medication Sig Dispense Refill    pantoprazole (PROTONIX) 20 MG tablet Take 20 mg by mouth daily      isosorbide mononitrate (IMDUR) 60 MG extended release tablet Take (1) tab AM and 1-1/2 tab  tablet 3    lisinopril (PRINIVIL;ZESTRIL) 2.5 MG tablet Take 1 tablet by mouth daily 30 tablet 3    ranolazine (RANEXA) 1000 MG extended release tablet Take 1 tablet by mouth 2 times daily 16 tablet 0    metoprolol tartrate (LOPRESSOR) 25 MG tablet Take 1 tablet by mouth 2 times daily 60 tablet 0    nitroGLYCERIN (NITROSTAT) 0.4 MG SL tablet up to max of 3 total doses. If no relief after 1 dose, call 911. 25 tablet 5    potassium chloride (KLOR-CON) 20 MEQ packet Take 1/2 tablet daily      atorvastatin (LIPITOR) 80 MG tablet Take 1 tablet by mouth nightly (Patient taking differently: Take 80 mg by mouth every morning ) 90 tablet 3    ELIQUIS 5 MG TABS tablet TAKE 1 TABLET BY MOUTH 2 TIMES DAILY  3    furosemide (LASIX) 80 MG tablet Take 1 tablet by mouth daily 90 tablet 3    aspirin 81 MG tablet Take 81 mg by mouth daily      Cholecalciferol (VITAMIN D) 2000 UNITS CAPS capsule Take 1/2 tablet daily       No current facility-administered medications for this visit. Allergies: Amiodarone, Azithromycin, Histamine, Pcn [penicillins], Penicillins, and Unable to assess    Review of Systems  Constitutional - no appetite change, or unexpected weight change. No fever, chills or diaphoresis.   No significant change in activity level or new onset of fatigue. HEENT - no significant rhinorrhea or epistaxis. No tinnitus or significant hearing loss. Eyes - no sudden vision change or amaurosis. No corneal arcus, xantholasma, subconjunctival hemorrhage or discharge. Respiratory - no significant wheezing, stridor, apnea or cough. No dyspnea on exertion or shortness of air. Cardiovascular - no exertional chest pain to suggest myocardial ischemia. No orthopnea or PND. No sensation of sustained arrythmia. No occurrence of slow heart rate. No palpitations. No claudication. Gastrointestinal - no abdominal swelling or pain. No blood in stool. No severe constipation, diarrhea, nausea, or vomiting. Genitourinary - no dysuria, frequency, or urgency. No flank pain or hematuria. Musculoskeletal - no back pain or myalgia. No problems with gait. Extremities - no clubbing, cyanosis or extremity edema. Skin - no color change or rash. No pallor. No new surgical incision. Neurologic - no speech difficulty, facial asymmetry or lateralizing weakness. No seizures, presyncope or syncope. No significant dizziness. Hematologic - no easy bruising or excessive bleeding. Psychiatric - no severe anxiety or insomnia. No confusion. All other review of systems are negative. Objective  Vital Signs - /68   Pulse 52   Ht 6' (1.829 m)   Wt 223 lb (101.2 kg)   SpO2 98%   BMI 30.24 kg/m²   General - [place]ee[place] An E is alert, cooperative, and pleasant. Well groomed. No acute distress. Body habitus - Body mass index is 30.24 kg/m². HEENT - Head is normocephalic. No circumoral cyanosis. Dentition is normal.  EYES -   Lids normal without ptosis. No discharge, edema or subconjunctival hemorrhage. Neck - Symmetrical without apparent mass or lymphadenopathy. Respiratory - Normal respiratory effort without use of accessory muscles. Ausculatation reveals vesicular breath sounds without crackles, wheezes, rub or rhonchi. Cardiovascular - No jugular venous distention. Auscultation reveals regular rate and rhythm. No audible clicks, gallop or rub. No murmur. No lower extremity varicosities. No carotid bruits. Abdominal -  No visible distention, mass or pulsations. Extremities - No clubbing or cyanosis. No statis dermatitis or ulcers. No edema.  + left leg pain due to PAD. Musculoskeletal -   No Osler's nodes. No kyphosis or scoliosis. Gait is even and regular without limp or shuffle. Ambulates without assistance. Skin -  Warm and dry; no rash or pallor. No new surgical wound. Neurological - No focal neurological deficits. Thought processes coherent. No apparent tremor. Oriented to person, place and time. Psychiatric -  Appropriate affect and mood. Data reviewed:  Prior Cardiac History -     06/27/2013 CABG X4 LIMA-diag, VG-LAD, VG-OM, VG-PDA Beny Ship)  11/10/2015  SE negative for myocardial ischemia  6/24/2016  TTE  Normal LVFX  7/2/2018 lexiscan Positive for inferior MI + myocardial ischemia, EF 50%, 12% ischemic myocardium on stress, intermediate risk findings, AUC indication 17, AUC score 7  7/11/18  Cath  Severe NVD, patent LIMA-LAD, patent yet extremely small VG-LAD, patent VG-RCA, closed VG-OM, anterior lateral akinesis, EF 35%  10/11/19  lexiscan Positive for inferior lateral myocardial ischemia, EF 51%, 1% ischemic myocardium on stress, low risk findings, AUC indication 15, AUC score 4, Agnes Murphy MD)   10/12/19  Cath  Severe NVD, patent LIMA-LAD, patent yet extremely small VG-LAD, patent VG-RCA, closed VG-OM, anterior lateral and inferior severe hypokinesis EF 30%    2/3/21 echo   Left ventricular chamber size is mildly dilated . Mild concentric left ventricular hypertrophy. Left ventricular systolic function is severely reduced   Left ventricular ejection fraction is visually estimated at 30%. Grade 3 LV diastolic dysfunction. Mild right atrial enlargement.    Mildly dilated right ventricle. Right ventricle global systolic function is moderately reduced    Electronically signed by Juwan Kruger DO(Interpreting   physician) on 02/03/2021 01:16 PM    8/19/20 Lexiscan  Impression   Impression:   There is large inferolateral infarct with no clear ischemia, with a   calculated ejection fraction of 42 %. Suggest: Clinical correlation. No significant change from [facility]   study 10/2019. Signed by Dr Mary Hinds on 8/21/2020 12:05 AM     Lab Results   Component Value Date    WBC 6.5 02/23/2021    HGB 13.6 (L) 02/23/2021    HCT 40.5 (L) 02/23/2021    MCV 97.1 (H) 02/23/2021     02/23/2021     Lab Results   Component Value Date     02/23/2021    K 4.3 02/23/2021     02/23/2021    CO2 28 02/23/2021    BUN 15 02/23/2021    CREATININE 0.8 02/23/2021    GLUCOSE 130 (H) 02/23/2021    CALCIUM 9.8 02/23/2021    PROT 6.5 (L) 08/11/2020    LABALBU 3.9 08/11/2020    BILITOT 0.6 08/11/2020    ALKPHOS 64 08/11/2020    AST 14 08/11/2020    ALT 8 08/11/2020    LABGLOM >60 02/23/2021    GFRAA >59 02/23/2021    GLOB 3.4 08/28/2016       Lab Results   Component Value Date    CHOL 126 (L) 03/20/2020    CHOL 122 (L) 01/25/2019    CHOL 120 (L) 12/27/2018     Lab Results   Component Value Date    TRIG 91 03/20/2020    TRIG 121 01/25/2019    TRIG 122 12/27/2018     Lab Results   Component Value Date    HDL 47 (L) 03/20/2020    HDL 41 (L) 01/25/2019    HDL 41 (L) 12/27/2018     Lab Results   Component Value Date    LDLCALC 61 03/20/2020    LDLCALC 57 01/25/2019    LDLCALC 55 12/27/2018     BP Readings from Last 3 Encounters:   04/07/22 102/68   10/06/21 98/64   07/12/21 110/68    Pulse Readings from Last 3 Encounters:   04/07/22 52   10/06/21 56   07/12/21 64        Wt Readings from Last 3 Encounters:   04/07/22 223 lb (101.2 kg)   10/06/21 223 lb (101.2 kg)   07/12/21 221 lb (100.2 kg)     Assessment/Plan:   Diagnosis Orders   1.  Coronary artery disease involving native coronary artery of native heart without angina pectoris     2. Chronic combined systolic and diastolic heart failure ([address]     3. Hx of CABG     4. S/P ablation of atrial fibrillation     5. Sick sinus syndrome ([address]     6. Essential hypertension     7. Mixed hyperlipidemia     8. AICD (automatic cardioverter/defibrillator) present       [id number] Score: 5  Disclaimer: Risk Score calculation is dependent on accuracy of patient problem list and past encounter diagnosis. AICD check  ICD interrogation showed adequate battery voltage and charge time. Mode:  AAI-DDD. Lead and defib impedances stable. Pacing: AP-VS 4.9%. Reprogramming for sensitivity and threshold testing. Normal diagnostics and safety margins noted. A output 1.375 V at 0.40 ms; P wave 2.0 mV. RV output 0.625 V at 0.40 ms; R wave 6.6 mV. No ICD discharges. Monitored arrhythmia: one monitored VT 02 seconds - see device chart for electrogram.  Sustained arrhythmia:  none. Optivol stable. Next Carelink: 3 months. Stable CV status without overt heart failure, sensed arrhythmia or angina. CAD s/p CABG - stable on current medical management. Continue same. PAF s/p ablation - AICD check shows no AF burden continuing on Lopressor and Eliquis. No bleeding issue reported. HTN - normotensive on current regimen. BP today 102/68. Continue same. Hyperlipidemia - LDL 61. Monitored and managed by PCP. Continue Lipitor 80 mg daily. Patient is compliant with medication regimen. Previous cardiac history and records reviewed. Continue current medical management for cardiac related condition. Continue other current medications as directed. Continue to follow up with primary care provider for non cardiac medical problems. If your primary care provider is outside of the [facility], please request that your labs be faxed to this office at [phone]. BP goal 130/80 or less.   Call the office with any problems, questions or concerns at scheduled IOL for gHTN

## 2024-08-27 NOTE — OB PROVIDER H&P - NS_TRIAGEMEMBRANE_OBGYN_ALL_OB
Intact V-Y Plasty Text: The defect edges were debeveled with a #15 scalpel blade.  Given the location of the defect, shape of the defect and the proximity to free margins an V-Y advancement flap was deemed most appropriate.  Using a sterile surgical marker, an appropriate advancement flap was drawn incorporating the defect and placing the expected incisions within the relaxed skin tension lines where possible.    The area thus outlined was incised deep to adipose tissue with a #15 scalpel blade.  The skin margins were undermined to an appropriate distance in all directions utilizing iris scissors.

## 2024-08-27 NOTE — OB PROVIDER IHI INDUCTION/AUGMENTATION NOTE - LABOR: CERVICAL EFFACEMENT
I have reviewed discharge instructions with the parent. The parent verbalized understanding. Patient left ED via Discharge Method: ambulatory to Home with parent. Opportunity for questions and clarification provided. Patient given 2 scripts. To continue your aftercare when you leave the hospital, you may receive an automated call from our care team to check in on how you are doing. This is a free service and part of our promise to provide the best care and service to meet your aftercare needs.  If you have questions, or wish to unsubscribe from this service please call [phone]. Thank you for Choosing our [facility] Emergency Department.
0%

## 2024-08-27 NOTE — OB RN PATIENT PROFILE - BP NONINVASIVE DIASTOLIC (MM HG)
Refill request for generic lunesta 3mg qhs #30 also alprazolam 0.5mg tid prn #75 also tramadol 50mg 1 po q 6 hours prn #90. All were last refilled on 1/5. Carlos. Patient last seen 9/26 has follow up 4/5.   84

## 2024-08-27 NOTE — OB PROVIDER H&P - ASSESSMENT
A/P: 24y  at 37.0 weeks GA presents to L&D for induction of labor for PEC.  -Admit to L&D  -Consent signed  -Admission labs  -HELLP labs sent  -CLD   -IV fluids   -Labor: Intact. Not in labor. Briseyda q7 min. Induce labor with vaginal cytotec, cervical balloon.  -Fetus: Cat I tracing. Continuous toco and fetal monitoring.   -GBS: Negative, no GBS ppx required   -Analgesia: epi PRN  -DVT ppx: Ambulate and SCD's while in bed     Discussed with Dr. Hooker, attending    Fely Greer, PGY-1

## 2024-08-27 NOTE — OB RN PATIENT PROFILE - FALL HARM RISK - TYPE OF ASSESSMENT
Admission Helical Rim Advancement Flap Text: The defect edges were debeveled with a #15 blade scalpel.  Given the location of the defect and the proximity to free margins (helical rim) a double helical rim advancement flap was deemed most appropriate.  Using a sterile surgical marker, the appropriate advancement flaps were drawn incorporating the defect and placing the expected incisions between the helical rim and antihelix where possible.  The area thus outlined was incised through and through with a #15 scalpel blade.  With a skin hook and iris scissors, the flaps were gently and sharply undermined and freed up.

## 2024-08-27 NOTE — OB PROVIDER H&P - NSLOWPPHRISK_OBGYN_A_OB
No previous uterine incision/Masters Pregnancy/Less than or equal to 4 previous vaginal births/No known bleeding disorder/No history of postpartum hemorrhage

## 2024-08-28 PROBLEM — N83.209 UNSPECIFIED OVARIAN CYST, UNSPECIFIED SIDE: Chronic | Status: INACTIVE | Noted: 2023-08-14 | Resolved: 2024-08-27

## 2024-08-28 LAB — T PALLIDUM AB TITR SER: NEGATIVE — SIGNIFICANT CHANGE UP

## 2024-08-28 PROCEDURE — 88307 TISSUE EXAM BY PATHOLOGIST: CPT | Mod: 26

## 2024-08-28 PROCEDURE — 59514 CESAREAN DELIVERY ONLY: CPT | Mod: U7

## 2024-08-28 DEVICE — INTERCEED 3 X 4": Type: IMPLANTABLE DEVICE | Status: FUNCTIONAL

## 2024-08-28 RX ORDER — TERBUTALINE SULFATE 1 MG/ML
0.25 VIAL (ML) SUBCUTANEOUS ONCE
Refills: 0 | Status: COMPLETED | OUTPATIENT
Start: 2024-08-28 | End: 2024-08-27

## 2024-08-28 RX ORDER — TETANUS TOXOID, REDUCED DIPHTHERIA TOXOID AND ACELLULAR PERTUSSIS VACCINE, ADSORBED 5; 2.5; 8; 8; 2.5 [IU]/.5ML; [IU]/.5ML; UG/.5ML; UG/.5ML; UG/.5ML
0.5 SUSPENSION INTRAMUSCULAR ONCE
Refills: 0 | Status: DISCONTINUED | OUTPATIENT
Start: 2024-08-28 | End: 2024-08-30

## 2024-08-28 RX ORDER — HYDROMORPHONE HYDROCHLORIDE 2 MG/1
0.5 TABLET ORAL
Refills: 0 | Status: DISCONTINUED | OUTPATIENT
Start: 2024-08-28 | End: 2024-08-29

## 2024-08-28 RX ORDER — OXYTOCIN 10 UNIT/ML
333.33 AMPUL (ML) INJECTION
Qty: 20 | Refills: 0 | Status: DISCONTINUED | OUTPATIENT
Start: 2024-08-28 | End: 2024-08-30

## 2024-08-28 RX ORDER — HYDROMORPHONE HYDROCHLORIDE 2 MG/1
30 TABLET ORAL
Refills: 0 | Status: DISCONTINUED | OUTPATIENT
Start: 2024-08-28 | End: 2024-08-29

## 2024-08-28 RX ORDER — OXYCODONE HYDROCHLORIDE 5 MG/1
5 TABLET ORAL
Refills: 0 | Status: DISCONTINUED | OUTPATIENT
Start: 2024-08-28 | End: 2024-08-30

## 2024-08-28 RX ORDER — HEPARIN SODIUM,BOVINE 1000/ML
5000 VIAL (ML) INJECTION EVERY 12 HOURS
Refills: 0 | Status: DISCONTINUED | OUTPATIENT
Start: 2024-08-28 | End: 2024-08-30

## 2024-08-28 RX ORDER — DIPHENHYDRAMINE HCL 50 MG
25 CAPSULE ORAL EVERY 6 HOURS
Refills: 0 | Status: DISCONTINUED | OUTPATIENT
Start: 2024-08-28 | End: 2024-08-30

## 2024-08-28 RX ORDER — KETOROLAC TROMETHAMINE 30 MG/ML
30 INJECTION, SOLUTION INTRAMUSCULAR EVERY 6 HOURS
Refills: 0 | Status: DISCONTINUED | OUTPATIENT
Start: 2024-08-28 | End: 2024-08-29

## 2024-08-28 RX ORDER — LANOLIN
1 OINTMENT (GRAM) TOPICAL EVERY 6 HOURS
Refills: 0 | Status: DISCONTINUED | OUTPATIENT
Start: 2024-08-28 | End: 2024-08-30

## 2024-08-28 RX ORDER — ACETAMINOPHEN 325 MG/1
975 TABLET ORAL
Refills: 0 | Status: DISCONTINUED | OUTPATIENT
Start: 2024-08-28 | End: 2024-08-30

## 2024-08-28 RX ORDER — ONDANSETRON 2 MG/ML
4 INJECTION, SOLUTION INTRAMUSCULAR; INTRAVENOUS EVERY 6 HOURS
Refills: 0 | Status: DISCONTINUED | OUTPATIENT
Start: 2024-08-28 | End: 2024-08-29

## 2024-08-28 RX ORDER — IBUPROFEN 600 MG
600 TABLET ORAL EVERY 6 HOURS
Refills: 0 | Status: COMPLETED | OUTPATIENT
Start: 2024-08-28 | End: 2025-07-27

## 2024-08-28 RX ORDER — OXYCODONE HYDROCHLORIDE 5 MG/1
5 TABLET ORAL ONCE
Refills: 0 | Status: DISCONTINUED | OUTPATIENT
Start: 2024-08-28 | End: 2024-08-30

## 2024-08-28 RX ORDER — NALOXONE HCL 1 MG/ML
0.1 VIAL (ML) INJECTION
Refills: 0 | Status: DISCONTINUED | OUTPATIENT
Start: 2024-08-28 | End: 2024-08-29

## 2024-08-28 RX ADMIN — Medication 1000 MILLIUNIT(S)/MIN: at 09:37

## 2024-08-28 RX ADMIN — KETOROLAC TROMETHAMINE 30 MILLIGRAM(S): 30 INJECTION, SOLUTION INTRAMUSCULAR at 18:14

## 2024-08-28 RX ADMIN — KETOROLAC TROMETHAMINE 30 MILLIGRAM(S): 30 INJECTION, SOLUTION INTRAMUSCULAR at 17:44

## 2024-08-28 RX ADMIN — KETOROLAC TROMETHAMINE 30 MILLIGRAM(S): 30 INJECTION, SOLUTION INTRAMUSCULAR at 12:45

## 2024-08-28 RX ADMIN — ACETAMINOPHEN 975 MILLIGRAM(S): 325 TABLET ORAL at 16:15

## 2024-08-28 RX ADMIN — HYDROMORPHONE HYDROCHLORIDE 30 MILLILITER(S): 2 TABLET ORAL at 07:37

## 2024-08-28 RX ADMIN — Medication 5000 UNIT(S): at 17:44

## 2024-08-28 RX ADMIN — KETOROLAC TROMETHAMINE 30 MILLIGRAM(S): 30 INJECTION, SOLUTION INTRAMUSCULAR at 12:15

## 2024-08-28 RX ADMIN — ACETAMINOPHEN 975 MILLIGRAM(S): 325 TABLET ORAL at 21:40

## 2024-08-28 RX ADMIN — HYDROMORPHONE HYDROCHLORIDE 30 MILLILITER(S): 2 TABLET ORAL at 19:53

## 2024-08-28 RX ADMIN — ACETAMINOPHEN 975 MILLIGRAM(S): 325 TABLET ORAL at 09:52

## 2024-08-28 RX ADMIN — ACETAMINOPHEN 975 MILLIGRAM(S): 325 TABLET ORAL at 22:40

## 2024-08-28 RX ADMIN — ACETAMINOPHEN 975 MILLIGRAM(S): 325 TABLET ORAL at 15:45

## 2024-08-28 NOTE — OB RN INTRAOPERATIVE NOTE - NSSELHIDDEN_OBGYN_ALL_OB_FT
[NS_DeliveryAttending1_OBGYN_ALL_OB_FT:[id number]],[NS_DeliveryAssist1_OBGYN_ALL_OB_FT:[id number]],[NS_DeliveryRN_OBGYN_ALL_OB_FT:[id number]]

## 2024-08-28 NOTE — OB RN DELIVERY SUMMARY - NSSELHIDDEN_OBGYN_ALL_OB_FT
[NS_DeliveryAttending1_OBGYN_ALL_OB_FT:[id number]],[NS_DeliveryAssist1_OBGYN_ALL_OB_FT:[id number]],[NS_DeliveryRN_OBGYN_ALL_OB_FT:[id number]=]

## 2024-08-28 NOTE — PROVIDER CONTACT NOTE (OTHER) - BACKGROUND
crash c section 37  wek gestation.    General anesthesia with PCA- Dilaudid  Comfortable  alert and Orientated X 4
Pt is a  at 37.1 weeks gest. with IOL for severe PEC. Pt then had a  for cord prolapse

## 2024-08-28 NOTE — OB NEONATOLOGY/PEDIATRICIAN DELIVERY SUMMARY - NSPEDSNEONOTESA_OBGYN_ALL_OB_FT
Requested to attend urgent C/S under general anesthesia for concern for cord prolapse. Mother is a 24 year old  with prenatal labs neg, NR and immune, GBS neg, blood type O +. This was an induction of labor for preeclampsia. AROM 10 hours prior to delivery with clear fluid. Infant emerged cephalic with good tone. delayed cord clamping x 1 minute. Warmed, dried, stimulated and suctioned. Apgars 9/9.

## 2024-08-28 NOTE — OB PROVIDER LABOR PROGRESS NOTE - NS_OBIHIFHRDETAILS_OBGYN_ALL_OB_FT
with recurrent variable decels, mod variability, +accels
8min prolonged decel to a vito of 60, pt repositioned to hands and knees with recovery, CB removed, ISE placed.
Cat 2: 140/mod variability/ + accels/ recurrent variable decels

## 2024-08-28 NOTE — OB RN DELIVERY SUMMARY - NS_SEPSISRSKCALC_OBGYN_ALL_OB_FT
EOS calculated successfully. EOS Risk Factor: 0.5/1000 live births ([facility] national incidence); GA=37w1d; Temp=99.14; ROM=9.283; GBS='Negative'; Antibiotics='No antibiotics or any antibiotics < 2 hrs prior to birth'

## 2024-08-28 NOTE — OB PROVIDER LABOR PROGRESS NOTE - NS_SUBJECTIVE/OBJECTIVE_OBGYN_ALL_OB_FT
At bedside for evaluation of recurrent variable decels, IUPC in place with amnioinfusion running. Pt without complaints.     Vital Signs Last 24 Hrs  T(C): 37.1 (27 Aug 2024 22:28), Max: 37.1 (27 Aug 2024 22:28)  T(F): 98.78 (27 Aug 2024 22:28), Max: 98.78 (27 Aug 2024 22:28)  HR: 75 (27 Aug 2024 22:55) (64 - 102)  BP: 149/69 (27 Aug 2024 22:46) (132/71 - 151/84)  RR: 16 (27 Aug 2024 19:27) (16 - 16)  SpO2: 97% (27 Aug 2024 22:55) (88% - 100%)    VE: 2/50/-3, IUPC/ISE in
Note delayed 2/2 clinical duties    Patient re-examined for progress and found to be 3-4cm with questionable umbilical cord palpated by patient's right side. Patient repositioned and cord no longer palpated however head notably ballotable. FHR down to 60s and at this time  in room to evaluate patient and confirmed impending cord prolapse. Decision made for emergent C/S under GETA. Consent obtained from patient. HR returned to baseline prior to stat C/S.
*Late entry due to clinical responsibility*    At bedside for placement of vaginal cytotec and CB. Following placement, pt with prolonged decel x8min with SROM clear fluid.     Vital Signs Last 24 Hrs  T(C): 36.8 (27 Aug 2024 19:27), Max: 36.8 (27 Aug 2024 19:02)  T(F): 98.2 (27 Aug 2024 19:16), Max: 98.24 (27 Aug 2024 19:02)  HR: 81 (27 Aug 2024 21:30) (70 - 102)  BP: 134/76 (27 Aug 2024 20:18) (132/71 - 151/84)  RR: 16 (27 Aug 2024 19:27) (16 - 16)  SpO2: 92% (27 Aug 2024 21:30) (92% - 100%)    VE: 1-2/50/-3, SROM clear fluid
Note delayed 2/2 clinical duties    IUPC placed for recurrent variable decelerations at 10pm. Amnioinfusion started however patient continued to have variables despite repositioning to all fours. Terbutaline x1 given with improvement of tracing to Cat 1. 30min later patient was then repositioned in right lateral and tracing continued to be Cat 1. Pitocin started at 2u    T(C): 37.1 (08-27-24 @ 23:51), Max: 37.1 (08-27-24 @ 22:28)  HR: 68 (08-28-24 @ 00:20) (64 - 102)  BP: 115/56 (08-28-24 @ 00:17) (95/59 - 151/84)  RR: 16 (08-27-24 @ 19:27) (16 - 16)  SpO2: 98% (08-28-24 @ 00:20) (88% - 100%)

## 2024-08-28 NOTE — PROVIDER CONTACT NOTE (OTHER) - ASSESSMENT
Pt reports no headaches or visual disturbances or epigastric distress. Blood pressures elevated as above, pt breastfeeding at this time with arm bent, instructed to straighten arm during blood pressure readings.
S/A

## 2024-08-28 NOTE — OB PROVIDER DELIVERY SUMMARY - NSSELHIDDEN_OBGYN_ALL_OB_FT
[NS_DeliveryAttending1_OBGYN_ALL_OB_FT:[id number]==],[NS_DeliveryAssist1_OBGYN_ALL_OB_FT:[id number]]

## 2024-08-28 NOTE — CHART NOTE - NSCHARTNOTEFT_GEN_A_CORE
Assessed patient due to call from nursing that patient had a blood pressure of 164/79 during orthostatic testing. Asked for a 15 minute repeat and a call back with BP but did not receive a call with an updated BP. Went to patient bedside to assess. On my arrival, PCA was leaving room with repeat BP of 150/81. On evaluation of patient at bedside, she reports feeling well with no HA, vision changes, CP, SOB, N/V, or RUQ abdominal pain. Pain is well controlled with medications. She has otherwise been recovering well postoperatively.     ICU Vital Signs Last 24 Hrs  T(C): 37.6 (28 Aug 2024 13:35), Max: 37.6 (28 Aug 2024 13:35)  T(F): 99.6 (28 Aug 2024 13:35), Max: 99.6 (28 Aug 2024 13:35)  HR: 60 (28 Aug 2024 14:37) (60 - 102)  BP: 150/81 (28 Aug 2024 14:37) (95/59 - 173/69)  BP(mean): 115 (28 Aug 2024 10:00) (90 - 115)  RR: 18 (28 Aug 2024 13:35) (14 - 25)  SpO2: 96% (28 Aug 2024 13:35) (88% - 100%)    O2 Parameters below as of 28 Aug 2024 13:35  Patient On (Oxygen Delivery Method): room air    Physical exam:   Gen: NAD  Pulm: breathing comfortably on room air   uro: cowan in place     A/P:   24 year old  day 0 s/p emergent pLTCS due to cord prolapse. Delivery complicated by PEC (HELLP labs wnl, P/C 0.5). Patient with an elevated SR BP which was not repeated until 1 hour later. Patient currently asymptomatic with no concerning symptoms. Plan is for 1 hour of BPs q15 mins to ensure no further SR BPs.     D/w Rosio Palomares PGY4 and Dr. Frausto, attending   Cici Vail, PGY1

## 2024-08-28 NOTE — OB PROVIDER LABOR PROGRESS NOTE - ASSESSMENT
25yo  @37+1 IOL for PEC (HELLP labs wnl, P/C 0.5) with variable decelerations s/p Terb x1 with IUPC/AI/ISE in place    - SVE: /-3  - FHT C at 2 now Cat 1, reassuring, ctm  - c/w pitocin at 2u    d/w Dr. Nhung Jo, PGY4
P0 @ 37w admitted for IOL for PEC, with prolonged decel following SROM, clear fluid, CB removed. ISE placed, VE 1-2/50/-3. Cat II tracing with variable decels but with moderate variability, no signs of fetal neurologic injury at this time. Will continue to monitor closely.     Dr. Hooker at bedside through-out above 
P0 @ 37w admitted for IOL for PEC, s/p vaginal cytotec and IUPC with persistent Cat II tracing, IUPC and ISE in, amnioinfusion running. Pt placed on hands and knees with resolution of variable decels and terb x1 given to disrupt contractions. Will continue to monitor closely. If pt continues to have Cat I tracing will consider low dose pitocin.     Pt seen with Dr. Hooker

## 2024-08-28 NOTE — CHART NOTE - NSCHARTNOTEFT_GEN_A_CORE
/Attending; Late Entry:    Went to the room of this 25y/o  @37.1wks, who was being induced 2nd to PEC and was currently on Pitocin at 2mu and had an amnioinfusion running since after she ruptured membranes at 8:45pm as she was having recurrent variables and required Terb x1, as pt had a prolonged decel down to the 50's for 3mins as pt was being examined by the Resident.  Per the Resident, a cord was palpated intermittently.  This MD then examined the pt and a cord was palpated on the right aspect of the fetal head and the head was then elevated off of the cord and plan for STAT C/S was made.  Hand was kept in the vagina while pt taken to the OR; Anesthesia was already called, along with Pediatricians and was all present in the OR. The 2nd  (Dr. Cramer) was called to start the C/S as this MD's hand was kept in the vagina and continued to elevate the fetal head until delivery; FHR in OR was 150bpm.  Emergency time out was performed prior to C/S.    Please see Delivery and Op note.    Dr. Sloan

## 2024-08-28 NOTE — OB PROVIDER DELIVERY SUMMARY - NSPROVIDERDELIVERYNOTE_OBGYN_ALL_OB_FT
Primary LTCS for cord prolapse, uncomplicated  Viable male infant, vertex presentation, Apgars 9/9, cord gasses sent  Grossly normal fallopian tubes, uterus, and ovaries  Uterus closed in 1 locked running layer with caprosyn  Interceed placed over hysterotomy  Peritoneum closed with 3-0 vicryl  Muscle close with 2-0 chromic  Fascia closed with 0 vicryl  Skin closed with 3-0 monocryl    EBL: 400  IVF: 1700  UOP: 100    MSharon Jo, PGY4  w/ Dr. Cramer Primary LTCS for cord prolapse, uncomplicated  Viable male infant, vertex presentation, Apgars 9/9, cord gasses sent  Grossly normal fallopian tubes, uterus, and ovaries  Uterus closed in 1 locked running layer with caprosyn  Interceed placed over hysterotomy  Peritoneum closed with 3-0 vicryl  Muscle close with 2-0 chromic  Fascia closed with 0 vicryl  Skin closed with 3-0 monocryl    EBL: 400  IVF: 1700  UOP: 100  Dictation#[id number]    CRISTEL Jo, PGY4  w/ Dr. Cramer

## 2024-08-29 LAB
BASOPHILS # BLD AUTO: 0.04 K/UL — SIGNIFICANT CHANGE UP (ref 0–0.2)
BASOPHILS NFR BLD AUTO: 0.3 % — SIGNIFICANT CHANGE UP (ref 0–2)
EOSINOPHIL # BLD AUTO: 0.11 K/UL — SIGNIFICANT CHANGE UP (ref 0–0.5)
EOSINOPHIL NFR BLD AUTO: 0.8 % — SIGNIFICANT CHANGE UP (ref 0–6)
HCT VFR BLD CALC: 23.4 % — LOW (ref 34.5–45)
HCT VFR BLD CALC: 26.1 % — LOW (ref 34.5–45)
HGB BLD-MCNC: 7.6 G/DL — LOW (ref 11.5–15.5)
HGB BLD-MCNC: 8.7 G/DL — LOW (ref 11.5–15.5)
IMM GRANULOCYTES NFR BLD AUTO: 0.7 % — SIGNIFICANT CHANGE UP (ref 0–0.9)
LYMPHOCYTES # BLD AUTO: 22.6 % — SIGNIFICANT CHANGE UP (ref 13–44)
LYMPHOCYTES # BLD AUTO: 3.05 K/UL — SIGNIFICANT CHANGE UP (ref 1–3.3)
MCHC RBC-ENTMCNC: 26.1 PG — LOW (ref 27–34)
MCHC RBC-ENTMCNC: 27.4 PG — SIGNIFICANT CHANGE UP (ref 27–34)
MCHC RBC-ENTMCNC: 32.5 GM/DL — SIGNIFICANT CHANGE UP (ref 32–36)
MCHC RBC-ENTMCNC: 33.3 GM/DL — SIGNIFICANT CHANGE UP (ref 32–36)
MCV RBC AUTO: 80.4 FL — SIGNIFICANT CHANGE UP (ref 80–100)
MCV RBC AUTO: 82.1 FL — SIGNIFICANT CHANGE UP (ref 80–100)
MONOCYTES # BLD AUTO: 0.88 K/UL — SIGNIFICANT CHANGE UP (ref 0–0.9)
MONOCYTES NFR BLD AUTO: 6.5 % — SIGNIFICANT CHANGE UP (ref 2–14)
NEUTROPHILS # BLD AUTO: 9.3 K/UL — HIGH (ref 1.8–7.4)
NEUTROPHILS NFR BLD AUTO: 69.1 % — SIGNIFICANT CHANGE UP (ref 43–77)
NRBC # BLD: 0 /100 WBCS — SIGNIFICANT CHANGE UP (ref 0–0)
NRBC # BLD: 0 /100 WBCS — SIGNIFICANT CHANGE UP (ref 0–0)
PLATELET # BLD AUTO: 218 K/UL — SIGNIFICANT CHANGE UP (ref 150–400)
PLATELET # BLD AUTO: 279 K/UL — SIGNIFICANT CHANGE UP (ref 150–400)
RBC # BLD: 2.91 M/UL — LOW (ref 3.8–5.2)
RBC # BLD: 3.18 M/UL — LOW (ref 3.8–5.2)
RBC # FLD: 14.4 % — SIGNIFICANT CHANGE UP (ref 10.3–14.5)
RBC # FLD: 14.5 % — SIGNIFICANT CHANGE UP (ref 10.3–14.5)
WBC # BLD: 13.47 K/UL — HIGH (ref 3.8–10.5)
WBC # BLD: 14.51 K/UL — HIGH (ref 3.8–10.5)
WBC # FLD AUTO: 13.47 K/UL — HIGH (ref 3.8–10.5)
WBC # FLD AUTO: 14.51 K/UL — HIGH (ref 3.8–10.5)

## 2024-08-29 RX ORDER — IBUPROFEN 600 MG
600 TABLET ORAL EVERY 6 HOURS
Refills: 0 | Status: DISCONTINUED | OUTPATIENT
Start: 2024-08-29 | End: 2024-08-30

## 2024-08-29 RX ADMIN — ACETAMINOPHEN 975 MILLIGRAM(S): 325 TABLET ORAL at 15:29

## 2024-08-29 RX ADMIN — Medication 600 MILLIGRAM(S): at 13:32

## 2024-08-29 RX ADMIN — Medication 5000 UNIT(S): at 17:55

## 2024-08-29 RX ADMIN — Medication 600 MILLIGRAM(S): at 13:02

## 2024-08-29 RX ADMIN — ACETAMINOPHEN 975 MILLIGRAM(S): 325 TABLET ORAL at 20:55

## 2024-08-29 RX ADMIN — HYDROMORPHONE HYDROCHLORIDE 30 MILLILITER(S): 2 TABLET ORAL at 07:34

## 2024-08-29 RX ADMIN — Medication 600 MILLIGRAM(S): at 17:54

## 2024-08-29 RX ADMIN — Medication 600 MILLIGRAM(S): at 23:27

## 2024-08-29 RX ADMIN — ACETAMINOPHEN 975 MILLIGRAM(S): 325 TABLET ORAL at 15:59

## 2024-08-29 RX ADMIN — ACETAMINOPHEN 975 MILLIGRAM(S): 325 TABLET ORAL at 03:43

## 2024-08-29 RX ADMIN — ACETAMINOPHEN 975 MILLIGRAM(S): 325 TABLET ORAL at 09:08

## 2024-08-29 RX ADMIN — ACETAMINOPHEN 975 MILLIGRAM(S): 325 TABLET ORAL at 20:25

## 2024-08-29 RX ADMIN — KETOROLAC TROMETHAMINE 30 MILLIGRAM(S): 30 INJECTION, SOLUTION INTRAMUSCULAR at 01:20

## 2024-08-29 RX ADMIN — Medication 600 MILLIGRAM(S): at 18:21

## 2024-08-29 RX ADMIN — KETOROLAC TROMETHAMINE 30 MILLIGRAM(S): 30 INJECTION, SOLUTION INTRAMUSCULAR at 00:44

## 2024-08-29 RX ADMIN — ACETAMINOPHEN 975 MILLIGRAM(S): 325 TABLET ORAL at 09:38

## 2024-08-29 RX ADMIN — Medication 5000 UNIT(S): at 06:23

## 2024-08-29 RX ADMIN — KETOROLAC TROMETHAMINE 30 MILLIGRAM(S): 30 INJECTION, SOLUTION INTRAMUSCULAR at 06:24

## 2024-08-29 RX ADMIN — Medication 600 MILLIGRAM(S): at 23:57

## 2024-08-29 NOTE — PROGRESS NOTE ADULT - ASSESSMENT
Pain Management Attending Addendum    SUBJECTIVE: Patient doing well with IV PCA    Therapy:    [X] IV PCA         [ ] PRN Analgesics    OBJECTIVE:   [X] Pain appropriately controlled    [ ] Other:    Side Effects:  [X] None	             [ ] Nausea              [ ] Pruritis                	[ ] Other:    ASSESSMENT/PLAN:  Therapy changed to PRN analgesics

## 2024-08-29 NOTE — PROGRESS NOTE ADULT - ATTENDING COMMENTS
Patient seen and evaluated this morning. Agree with above. No acute events or complaints o/n. Tolerating regular diet. Ambulating without difficulty. Voiding. Breat feeding infant. Denies HA/n/v/f/c/lightheadedness ro dizziness. Incision c/d/i with absorbable suture. VSS with BPs mild range and no sustained severe range BPs. AM H/H 7.6/23.4 patient asymptomatic. Routine postop care and close monitoring.                          7.6    13.47 )-----------( 218      ( 29 Aug 2024 07:03 )             23.4     ICU Vital Signs Last 24 Hrs  T(C): 36.8 (29 Aug 2024 08:34), Max: 37.6 (28 Aug 2024 13:35)  T(F): 98.3 (29 Aug 2024 08:34), Max: 99.6 (28 Aug 2024 13:35)  HR: 82 (29 Aug 2024 08:34) (60 - 86)  BP: 135/84 (29 Aug 2024 08:34) (113/78 - 164/79)  RR: 18 (29 Aug 2024 08:34) (18 - 18)  SpO2: 92% (29 Aug 2024 08:34) (91% - 96%)    O2 Parameters below as of 29 Aug 2024 08:34  Patient On (Oxygen Delivery Method): room air

## 2024-08-29 NOTE — PROGRESS NOTE ADULT - ASSESSMENT
23y/o POD#1 from [facility] for cord prolapse under general anesthesia. . Patient is currently in stable condition.    #Preeclampsia   - BP's overnight 120s-140/80s  - HELLP labs wnl, P/C 0.5  - Continue to monitor BP's    #Routine Postpartum Care  - Continue with PO pain management  - Increase ambulation as tolerated   - Continue regular diet  - POD#1 AM CBC HCT 23.4 today from 29.5  - Incision dressing removed today    Cici Vail  PGY-1 25y/o POD#1 from [facility] for cord prolapse under general anesthesia c/b PEC . . Patient is currently in stable condition.    #Preeclampsia   - BP's overnight 120s-140/80s  - HELLP labs wnl, P/C 0.5  - Continue to monitor BP's    #Routine Postpartum Care  - Continue with PO pain management  - Increase ambulation as tolerated   - Continue regular diet  - POD#1 AM CBC HCT 23.4 today from 29.5  - Incision dressing removed today    Cici Vail  PGY-1

## 2024-08-30 ENCOUNTER — TRANSCRIPTION ENCOUNTER (OUTPATIENT)
Age: 24
End: 2024-08-30

## 2024-08-30 VITALS
OXYGEN SATURATION: 98 % | DIASTOLIC BLOOD PRESSURE: 90 MMHG | RESPIRATION RATE: 18 BRPM | HEART RATE: 70 BPM | TEMPERATURE: 98 F | SYSTOLIC BLOOD PRESSURE: 133 MMHG

## 2024-08-30 PROCEDURE — 86780 TREPONEMA PALLIDUM: CPT

## 2024-08-30 PROCEDURE — 59050 FETAL MONITOR W/REPORT: CPT

## 2024-08-30 PROCEDURE — 86850 RBC ANTIBODY SCREEN: CPT

## 2024-08-30 PROCEDURE — 86900 BLOOD TYPING SEROLOGIC ABO: CPT

## 2024-08-30 PROCEDURE — 86901 BLOOD TYPING SEROLOGIC RH(D): CPT

## 2024-08-30 PROCEDURE — 82570 ASSAY OF URINE CREATININE: CPT

## 2024-08-30 PROCEDURE — 59025 FETAL NON-STRESS TEST: CPT

## 2024-08-30 PROCEDURE — 88307 TISSUE EXAM BY PATHOLOGIST: CPT

## 2024-08-30 PROCEDURE — C9399: CPT

## 2024-08-30 PROCEDURE — C1765: CPT

## 2024-08-30 PROCEDURE — 85025 COMPLETE CBC W/AUTO DIFF WBC: CPT

## 2024-08-30 PROCEDURE — 80053 COMPREHEN METABOLIC PANEL: CPT

## 2024-08-30 PROCEDURE — 84156 ASSAY OF PROTEIN URINE: CPT

## 2024-08-30 PROCEDURE — 84550 ASSAY OF BLOOD/URIC ACID: CPT

## 2024-08-30 PROCEDURE — 85027 COMPLETE CBC AUTOMATED: CPT

## 2024-08-30 PROCEDURE — 81001 URINALYSIS AUTO W/SCOPE: CPT

## 2024-08-30 PROCEDURE — 83615 LACTATE (LD) (LDH) ENZYME: CPT

## 2024-08-30 RX ORDER — IBUPROFEN 600 MG
1 TABLET ORAL
Qty: 0 | Refills: 0 | DISCHARGE
Start: 2024-08-30

## 2024-08-30 RX ORDER — ACETAMINOPHEN 325 MG/1
3 TABLET ORAL
Qty: 0 | Refills: 0 | DISCHARGE
Start: 2024-08-30

## 2024-08-30 RX ORDER — NORETHINDRONE 0.35 MG/1
1 TABLET ORAL
Qty: 30 | Refills: 1
Start: 2024-08-30

## 2024-08-30 RX ORDER — OXYCODONE HYDROCHLORIDE 5 MG/1
1 TABLET ORAL
Qty: 9 | Refills: 0
Start: 2024-08-30 | End: 2024-09-01

## 2024-08-30 RX ADMIN — Medication 600 MILLIGRAM(S): at 06:18

## 2024-08-30 RX ADMIN — Medication 600 MILLIGRAM(S): at 11:40

## 2024-08-30 RX ADMIN — ACETAMINOPHEN 975 MILLIGRAM(S): 325 TABLET ORAL at 02:30

## 2024-08-30 RX ADMIN — Medication 5000 UNIT(S): at 06:18

## 2024-08-30 RX ADMIN — Medication 600 MILLIGRAM(S): at 12:10

## 2024-08-30 RX ADMIN — ACETAMINOPHEN 975 MILLIGRAM(S): 325 TABLET ORAL at 14:59

## 2024-08-30 RX ADMIN — ACETAMINOPHEN 975 MILLIGRAM(S): 325 TABLET ORAL at 09:39

## 2024-08-30 RX ADMIN — ACETAMINOPHEN 975 MILLIGRAM(S): 325 TABLET ORAL at 14:29

## 2024-08-30 RX ADMIN — ACETAMINOPHEN 975 MILLIGRAM(S): 325 TABLET ORAL at 10:09

## 2024-08-30 RX ADMIN — ACETAMINOPHEN 975 MILLIGRAM(S): 325 TABLET ORAL at 03:00

## 2024-08-30 NOTE — DISCHARGE NOTE OB - ADDITIONAL INSTRUCTIONS
Follow up within 1 week for BP check. Please call for appointment: [phone]  Follow-up for incision check in 2 weeks at [facility] clinic. Please call for appointment: [phone]  Regular diet.  Resume normal activity as tolerated. [facility] weeks.  No heavy lifting, driving, or strenuous activity for 6 weeks.  Nothing per vagina such as tampons, intercourse, douches or tub baths for 6 weeks or until you see your doctor.  Call your doctor with any signs and symptoms of infection such as fever, chills, nausea or vomiting.  Call your doctor if you're unable to tolerate food, increase in vaginal bleeding or have difficulty urinating.  Call your doctor if you have pain that is not relieved by your prescribed medications.  Notify your doctor with any other concerns.

## 2024-08-30 NOTE — DISCHARGE NOTE OB - HOSPITAL COURSE
Patient underwent an uncomplicated emergent primary LTCS for cord prolapse. Course c/b preeclampsia without severe features. . H/H appropriate for EBL post-op. Patient’s postoperative course was unremarkable and she remained hemodynamically stable and afebrile throughout. On day of discharge, the patient was ambulating and voiding spontaneously, tolerating oral intake, pain was well controlled with oral medication, and vital signs were stable.

## 2024-08-30 NOTE — DISCHARGE NOTE OB - PROVIDER TOKENS
FREE:[LAST:[[facility]],PHONE:[phone]],FAX:[(   )    -],ADDRESS:[[address] [place] [address]]]

## 2024-08-30 NOTE — DISCHARGE NOTE OB - MEDICATION SUMMARY - MEDICATIONS TO STOP TAKING
I will STOP taking the medications listed below when I get home from the hospital:    cefpodoxime 100 mg oral tablet  -- 1 tab(s) by mouth 2 times a day

## 2024-08-30 NOTE — DISCHARGE NOTE OB - MEDICATION SUMMARY - MEDICATIONS TO TAKE
I will START or STAY ON the medications listed below when I get home from the hospital:    ibuprofen 600 mg oral tablet  -- 1 tab(s) by mouth every 6 hours  -- Indication: For Pain    acetaminophen 325 mg oral tablet  -- 3 tab(s) by mouth every 6 hours  -- Indication: For Pain    oxyCODONE 5 mg oral tablet  -- 1 tab(s) by mouth every 8 hours as needed for  severe pain MDD: 3 tabs  -- Indication: For severe pain   I will START or STAY ON the medications listed below when I get home from the hospital:    ibuprofen 600 mg oral tablet  -- 1 tab(s) by mouth every 6 hours  -- Indication: For Pain    acetaminophen 325 mg oral tablet  -- 3 tab(s) by mouth every 6 hours  -- Indication: For Pain    oxyCODONE 5 mg oral tablet  -- 1 tab(s) by mouth every 8 hours as needed for  severe pain MDD: 3 tabs  -- Indication: For severe pain    norethindrone 0.35 mg oral tablet  -- 1 tab(s) by mouth once a day  -- Indication: For  delivery delivered

## 2024-08-30 NOTE — PROGRESS NOTE ADULT - ASSESSMENT
25[facility] c/b PEC . . Patient is currently in stable condition.    #Preeclampsia   - BP's overnight 130s-140/80s  - HELLP labs wnl, P/C 0.5  - Continue to monitor BP's    #Routine Postpartum Care  - Continue with PO pain management  - Increase ambulation as tolerated   - Continue regular diet  -Discharge planning for today    Cici Vail  PGY-1

## 2024-08-30 NOTE — DISCHARGE NOTE OB - PATIENT PORTAL LINK FT
You can access the FollowMyHealth Patient Portal offered by [facility] by registering at the following website: [email or web address]. By joining [employer]’s FollowMyHealth portal, you will also be able to view your health information using other applications (apps) compatible with our system.

## 2024-08-30 NOTE — DISCHARGE NOTE OB - CARE PROVIDER_API CALL
[facility],   [address] [place] [id number]  Phone: [phone]  Fax: (   )    -  Follow Up Time:

## 2024-08-30 NOTE — PROGRESS NOTE ADULT - ATTENDING COMMENTS
Agree with above. Patient seen and evaluated at bedside. No acute events or complaints o/n or this morning. Meeting all postop milestones. VSS. Incision c/d/i with suture. Abdomen soft, ND, appropriately tender. 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 Precaution and pain regimen at home reviewed. Discharge home today.   MD Brian

## 2024-08-30 NOTE — PROGRESS NOTE ADULT - SUBJECTIVE AND OBJECTIVE BOX
Day 1 of Anesthesia Pain Management Service    SUBJECTIVE: Doing ok    Pain Scale Score:	[X] Refer to charted pain scores    THERAPY:    [ ] IV PCA Morphine		        [ ] 5 mg/mL	[ ] 1 mg/mL  [X] IV PCA Hydromorphone	[ ] 5 mg/mL	[X] 1 mg/mL  [ ] IV PCA Fentanyl		        [ ] 50 micrograms/mL    Demand dose: 0.2 mg     Lockout: 6 minutes   Continuous Rate: 0 mg/hr  4 Hour Limit: 4 mg    MEDICATIONS  (STANDING):  acetaminophen     Tablet .. 975 milliGRAM(s) Oral <User Schedule>  diphtheria/tetanus/pertussis (acellular) Vaccine (Adacel) 0.5 milliLiter(s) IntraMuscular once  heparin   Injectable 5000 Unit(s) SubCutaneous every 12 hours  HYDROmorphone PCA (1 mG/mL) 30 milliLiter(s) PCA Continuous PCA Continuous  ibuprofen  Tablet. 600 milliGRAM(s) Oral every 6 hours  oxytocin Infusion 333.333 milliUNIT(s)/Min (1000 mL/Hr) IV Continuous <Continuous>  sodium chloride 0.9%. 1000 milliLiter(s) (125 mL/Hr) IV Continuous <Continuous>    MEDICATIONS  (PRN):  diphenhydrAMINE 25 milliGRAM(s) Oral every 6 hours PRN Pruritus  HYDROmorphone PCA (1 mG/mL) Rescue Clinician Bolus 0.5 milliGRAM(s) IV Push every 15 minutes PRN for Pain Scale GREATER THAN 6  lanolin Ointment 1 Application(s) Topical every 6 hours PRN Sore Nipples  magnesium hydroxide Suspension 30 milliLiter(s) Oral two times a day PRN Constipation  naloxone Injectable 0.1 milliGRAM(s) IV Push every 3 minutes PRN For ANY of the following changes in patient status:  A. RR LESS THAN 10 breaths per minute, B. Oxygen saturation LESS THAN 90%, C. Sedation score of 6  ondansetron Injectable 4 milliGRAM(s) IV Push every 6 hours PRN Nausea  oxyCODONE    IR 5 milliGRAM(s) Oral every 3 hours PRN Moderate to Severe Pain (4-10)  oxyCODONE    IR 5 milliGRAM(s) Oral once PRN Moderate to Severe Pain (4-10)  simethicone 80 milliGRAM(s) Chew every 4 hours PRN Gas      OBJECTIVE:    Sedation Score:	[ X] Alert	 [ ] Drowsy 	[ ] Arousable	[ ] Asleep	[ ] Unresponsive    Side Effects:	[X ] None	[ ] Nausea	[ ] Vomiting	[ ] Pruritus  		[ ] Other:    Vital Signs Last 24 Hrs  T(C): 37.3 (29 Aug 2024 06:10), Max: 37.6 (28 Aug 2024 13:35)  T(F): 99.1 (29 Aug 2024 06:10), Max: 99.6 (28 Aug 2024 13:35)  HR: 86 (29 Aug 2024 06:10) (60 - 94)  BP: 140/89 (29 Aug 2024 06:10) (113/78 - 164/79)  BP(mean): 115 (28 Aug 2024 10:00) (97 - 115)  RR: 18 (29 Aug 2024 06:10) (14 - 25)  SpO2: 91% (29 Aug 2024 06:10) (88% - 97%)    Parameters below as of 29 Aug 2024 06:10  Patient On (Oxygen Delivery Method): room air        ASSESSMENT/ PLAN    Therapy to  be:               [  ] Continued   [X ] Discontinued   [ X] Changed to PRN Analgesics    Documentation and Verification of current medications:   [X] Done	[ ] Not done, not eligible    Comments: Pain controlled, OOB ambulating D\C PCA, transition to prn analgesics 
Patient seen and examined at bedside, no acute overnight events. No acute complaints, pain well controlled. Patient is ambulating and tolerating regular diet. Has passed flatus. Voiding spontaneously. Denies CP, SOB, N/V, HA, blurred vision, epigastric pain.    Vital Signs Last 24 Hours  T(C): 36.8 (08-29-24 @ 08:34), Max: 37.6 (08-28-24 @ 13:35)  HR: 82 (08-29-24 @ 08:34) (60 - 94)  BP: 135/84 (08-29-24 @ 08:34) (113/78 - 164/79)  RR: 18 (08-29-24 @ 08:34) (15 - 25)  SpO2: 92% (08-29-24 @ 08:34) (91% - 97%)    I&O's Summary    28 Aug 2024 07:01  -  29 Aug 2024 07:00  --------------------------------------------------------  IN: 1000 mL / OUT: 2050 mL / NET: -1050 mL        Physical Exam:  General: NAD  Abdomen: Soft, non-tender, non-distended, fundus firm  Incision: Overlying bandage with gauze removed, Pfannenstiel incision CDI, subcuticular suture closure  Pelvic: Lochia wnl, external exam of perineum clean and dry without swelling    Labs:    Blood Type: O Positive  Antibody Screen: Negative  RPR: Negative               7.6    13.47 )-----------( 218      ( 08-29 @ 07:03 )             23.4                9.9    11.76 )-----------( 242      ( 08-27 @ 20:00 )             29.5                9.7    12.45 )-----------( 242      ( 08-23 @ 11:41 )             28.3         MEDICATIONS  (STANDING):  acetaminophen     Tablet .. 975 milliGRAM(s) Oral <User Schedule>  diphtheria/tetanus/pertussis (acellular) Vaccine (Adacel) 0.5 milliLiter(s) IntraMuscular once  heparin   Injectable 5000 Unit(s) SubCutaneous every 12 hours  ibuprofen  Tablet. 600 milliGRAM(s) Oral every 6 hours  oxytocin Infusion 333.333 milliUNIT(s)/Min (1000 mL/Hr) IV Continuous <Continuous>  sodium chloride 0.9%. 1000 milliLiter(s) (125 mL/Hr) IV Continuous <Continuous>    MEDICATIONS  (PRN):  diphenhydrAMINE 25 milliGRAM(s) Oral every 6 hours PRN Pruritus  lanolin Ointment 1 Application(s) Topical every 6 hours PRN Sore Nipples  magnesium hydroxide Suspension 30 milliLiter(s) Oral two times a day PRN Constipation  oxyCODONE    IR 5 milliGRAM(s) Oral every 3 hours PRN Moderate to Severe Pain (4-10)  oxyCODONE    IR 5 milliGRAM(s) Oral once PRN Moderate to Severe Pain (4-10)  simethicone 80 milliGRAM(s) Chew every 4 hours PRN Gas  
Patient seen and examined at bedside, no acute overnight events. No acute complaints, pain well controlled. Patient is ambulating and tolerating regular diet. Has passed flatus. Voiding spontaneously. Denies CP, SOB, N/V, HA, blurred vision, epigastric pain.    Vital Signs Last 24 Hours  T(C): 36.7 (08-30-24 @ 09:28), Max: 37.1 (08-29-24 @ 13:35)  HR: 70 (08-30-24 @ 09:28) (70 - 101)  BP: 133/90 (08-30-24 @ 09:28) (133/84 - 146/84)  RR: 18 (08-30-24 @ 09:28) (18 - 18)  SpO2: 98% (08-30-24 @ 09:28) (98% - 99%)    I&O's Summary      Physical Exam:  General: NAD  Abdomen: Soft, non-tender, non-distended, fundus firm  Incision: Pfannenstiel incision CDI, subcuticular suture closure  Pelvic: Lochia wnl     Labs:    Blood Type: O Positive  Antibody Screen: Negative  RPR: Negative               8.7    14.51 )-----------( 279      ( 08-29 @ 16:39 )             26.1                7.6    13.47 )-----------( 218      ( 08-29 @ 07:03 )             23.4                9.9    11.76 )-----------( 242      ( 08-27 @ 20:00 )             29.5         MEDICATIONS  (STANDING):  acetaminophen     Tablet .. 975 milliGRAM(s) Oral <User Schedule>  diphtheria/tetanus/pertussis (acellular) Vaccine (Adacel) 0.5 milliLiter(s) IntraMuscular once  heparin   Injectable 5000 Unit(s) SubCutaneous every 12 hours  ibuprofen  Tablet. 600 milliGRAM(s) Oral every 6 hours  oxytocin Infusion 333.333 milliUNIT(s)/Min (1000 mL/Hr) IV Continuous <Continuous>    MEDICATIONS  (PRN):  diphenhydrAMINE 25 milliGRAM(s) Oral every 6 hours PRN Pruritus  lanolin Ointment 1 Application(s) Topical every 6 hours PRN Sore Nipples  magnesium hydroxide Suspension 30 milliLiter(s) Oral two times a day PRN Constipation  oxyCODONE    IR 5 milliGRAM(s) Oral every 3 hours PRN Moderate to Severe Pain (4-10)  oxyCODONE    IR 5 milliGRAM(s) Oral once PRN Moderate to Severe Pain (4-10)  simethicone 80 milliGRAM(s) Chew every 4 hours PRN Gas

## 2024-09-04 ENCOUNTER — APPOINTMENT (OUTPATIENT)
Dept: ANTEPARTUM | Facility: CLINIC | Age: 24
End: 2024-09-04

## 2024-09-04 PROBLEM — D64.9 ANEMIA, UNSPECIFIED: Chronic | Status: ACTIVE | Noted: 2024-08-27

## 2024-09-04 PROBLEM — E28.2 POLYCYSTIC OVARIAN SYNDROME: Chronic | Status: ACTIVE | Noted: 2024-08-27

## 2024-09-05 DIAGNOSIS — Z34.80 ENCOUNTER FOR SUPERVISION OF OTHER NORMAL PREGNANCY, UNSPECIFIED TRIMESTER: ICD-10-CM

## 2024-09-05 LAB — SURGICAL PATHOLOGY STUDY: SIGNIFICANT CHANGE UP

## 2024-09-10 ENCOUNTER — OUTPATIENT (OUTPATIENT)
Dept: OUTPATIENT SERVICES | Facility: HOSPITAL | Age: 24
LOS: 1 days | End: 2024-09-10
Payer: MEDICAID

## 2024-09-10 ENCOUNTER — APPOINTMENT (OUTPATIENT)
Dept: OBGYN | Facility: CLINIC | Age: 24
End: 2024-09-10
Payer: MEDICAID

## 2024-09-10 VITALS — SYSTOLIC BLOOD PRESSURE: 122 MMHG | WEIGHT: 148 LBS | DIASTOLIC BLOOD PRESSURE: 80 MMHG

## 2024-09-10 DIAGNOSIS — Z34.90 ENCOUNTER FOR SUPERVISION OF NORMAL PREGNANCY, UNSPECIFIED, UNSPECIFIED TRIMESTER: ICD-10-CM

## 2024-09-10 DIAGNOSIS — O26.893 OTHER SPECIFIED PREGNANCY RELATED CONDITIONS, THIRD TRIMESTER: ICD-10-CM

## 2024-09-10 DIAGNOSIS — O99.810 ABNORMAL GLUCOSE COMPLICATING PREGNANCY: ICD-10-CM

## 2024-09-10 DIAGNOSIS — Z34.93 ENCOUNTER FOR SUPERVISION OF NORMAL PREGNANCY, UNSPECIFIED, THIRD TRIMESTER: ICD-10-CM

## 2024-09-10 DIAGNOSIS — O99.019 ANEMIA COMPLICATING PREGNANCY, UNSPECIFIED TRIMESTER: ICD-10-CM

## 2024-09-10 DIAGNOSIS — R51.9 OTHER SPECIFIED PREGNANCY RELATED CONDITIONS, THIRD TRIMESTER: ICD-10-CM

## 2024-09-10 LAB
BILIRUB UR QL STRIP: NORMAL
CLARITY UR: CLEAR
COLLECTION METHOD: NORMAL
GLUCOSE UR-MCNC: NORMAL
HCG UR QL: 0.2 EU/DL
HGB UR QL STRIP.AUTO: NORMAL
KETONES UR-MCNC: NORMAL
LEUKOCYTE ESTERASE UR QL STRIP: NORMAL
NITRITE UR QL STRIP: NORMAL
PH UR STRIP: 7
PROT UR STRIP-MCNC: NORMAL
SP GR UR STRIP: 1.01

## 2024-09-10 PROCEDURE — 81003 URINALYSIS AUTO W/O SCOPE: CPT

## 2024-09-10 PROCEDURE — G0463: CPT

## 2024-09-10 PROCEDURE — 99213 OFFICE O/P EST LOW 20 MIN: CPT | Mod: 25

## 2024-09-12 ENCOUNTER — NON-APPOINTMENT (OUTPATIENT)
Age: 24
End: 2024-09-12

## 2024-09-12 ENCOUNTER — APPOINTMENT (OUTPATIENT)
Dept: OBGYN | Facility: CLINIC | Age: 24
End: 2024-09-12

## 2024-09-23 ENCOUNTER — TRANSCRIPTION ENCOUNTER (OUTPATIENT)
Age: 24
End: 2024-09-23

## 2024-10-10 ENCOUNTER — APPOINTMENT (OUTPATIENT)
Dept: OBGYN | Facility: CLINIC | Age: 24
End: 2024-10-10

## 2024-10-10 ENCOUNTER — OUTPATIENT (OUTPATIENT)
Dept: OUTPATIENT SERVICES | Facility: HOSPITAL | Age: 24
LOS: 1 days | End: 2024-10-10
Payer: MEDICAID

## 2024-10-10 VITALS — DIASTOLIC BLOOD PRESSURE: 76 MMHG | WEIGHT: 145 LBS | SYSTOLIC BLOOD PRESSURE: 118 MMHG

## 2024-10-10 DIAGNOSIS — Z98.891 HISTORY OF UTERINE SCAR FROM PREVIOUS SURGERY: ICD-10-CM

## 2024-10-10 PROCEDURE — 99212 OFFICE O/P EST SF 10 MIN: CPT

## 2024-10-10 PROCEDURE — G0463: CPT

## 2024-10-10 RX ORDER — NORETHINDRONE ACETATE AND ETHINYL ESTRADIOL 1; 20 MG/1; UG/1
1-20 TABLET ORAL DAILY
Qty: 28 | Refills: 11 | Status: ACTIVE | COMMUNITY
Start: 2024-10-10 | End: 1900-01-01

## 2024-10-17 ENCOUNTER — APPOINTMENT (OUTPATIENT)
Dept: CARDIOLOGY | Facility: CLINIC | Age: 24
End: 2024-10-17
Payer: MEDICAID

## 2024-10-17 VITALS
SYSTOLIC BLOOD PRESSURE: 112 MMHG | BODY MASS INDEX: 27 KG/M2 | WEIGHT: 143 LBS | OXYGEN SATURATION: 99 % | DIASTOLIC BLOOD PRESSURE: 76 MMHG | HEIGHT: 61 IN | HEART RATE: 89 BPM

## 2024-10-17 DIAGNOSIS — R00.2 PALPITATIONS: ICD-10-CM

## 2024-10-17 PROCEDURE — 93000 ELECTROCARDIOGRAM COMPLETE: CPT

## 2024-10-17 PROCEDURE — 99212 OFFICE O/P EST SF 10 MIN: CPT | Mod: 25

## 2025-07-25 ENCOUNTER — RX CHANGE (OUTPATIENT)
Age: 25
End: 2025-07-25

## 2025-07-25 ENCOUNTER — LABORATORY RESULT (OUTPATIENT)
Age: 25
End: 2025-07-25

## 2025-07-25 ENCOUNTER — RESULT REVIEW (OUTPATIENT)
Age: 25
End: 2025-07-25

## 2025-07-25 ENCOUNTER — OUTPATIENT (OUTPATIENT)
Dept: OUTPATIENT SERVICES | Facility: HOSPITAL | Age: 25
LOS: 1 days | End: 2025-07-25
Payer: MEDICAID

## 2025-07-25 ENCOUNTER — APPOINTMENT (OUTPATIENT)
Dept: OBGYN | Facility: CLINIC | Age: 25
End: 2025-07-25
Payer: MEDICAID

## 2025-07-25 VITALS
BODY MASS INDEX: 30.64 KG/M2 | HEIGHT: 59 IN | SYSTOLIC BLOOD PRESSURE: 134 MMHG | DIASTOLIC BLOOD PRESSURE: 92 MMHG | WEIGHT: 152 LBS | HEART RATE: 91 BPM

## 2025-07-25 DIAGNOSIS — N76.0 ACUTE VAGINITIS: ICD-10-CM

## 2025-07-25 DIAGNOSIS — Z30.41 ENCOUNTER FOR SURVEILLANCE OF CONTRACEPTIVE PILLS: ICD-10-CM

## 2025-07-25 DIAGNOSIS — N92.1 EXCESSIVE AND FREQUENT MENSTRUATION WITH IRREGULAR CYCLE: ICD-10-CM

## 2025-07-25 DIAGNOSIS — Z01.419 ENCOUNTER FOR GYNECOLOGICAL EXAMINATION (GENERAL) (ROUTINE) W/OUT ABNORMAL FINDINGS: ICD-10-CM

## 2025-07-25 DIAGNOSIS — N89.8 OTHER SPECIFIED NONINFLAMMATORY DISORDERS OF VAGINA: ICD-10-CM

## 2025-07-25 DIAGNOSIS — O13.3 GESTATIONAL [PREGNANCY-INDUCED] HYPERTENSION W/OUT SIGNIFICANT PROTEINURIA, THIRD TRIMESTER: ICD-10-CM

## 2025-07-25 DIAGNOSIS — Z11.3 ENCOUNTER FOR SCREENING FOR INFECTIONS WITH A PREDOMINANTLY SEXUAL MODE OF TRANSMISSION: ICD-10-CM

## 2025-07-25 DIAGNOSIS — N92.6 IRREGULAR MENSTRUATION, UNSPECIFIED: ICD-10-CM

## 2025-07-25 DIAGNOSIS — N94.6 DYSMENORRHEA, UNSPECIFIED: ICD-10-CM

## 2025-07-25 PROCEDURE — 87591 N.GONORRHOEAE DNA AMP PROB: CPT

## 2025-07-25 PROCEDURE — 99395 PREV VISIT EST AGE 18-39: CPT

## 2025-07-25 PROCEDURE — 87491 CHLMYD TRACH DNA AMP PROBE: CPT

## 2025-07-25 PROCEDURE — G0463: CPT

## 2025-07-25 RX ORDER — NORETHINDRONE ACETATE AND ETHINYL ESTRADIOL AND FERROUS FUMARATE 1MG-20(21)
1-20 KIT ORAL
Qty: 3 | Refills: 3 | Status: ACTIVE | COMMUNITY
Start: 2025-07-25 | End: 1900-01-01

## 2025-07-29 DIAGNOSIS — Z01.419 ENCOUNTER FOR GYNECOLOGICAL EXAMINATION (GENERAL) (ROUTINE) WITHOUT ABNORMAL FINDINGS: ICD-10-CM

## 2025-07-29 DIAGNOSIS — N92.1 EXCESSIVE AND FREQUENT MENSTRUATION WITH IRREGULAR CYCLE: ICD-10-CM

## 2025-07-29 DIAGNOSIS — N94.6 DYSMENORRHEA, UNSPECIFIED: ICD-10-CM

## 2025-07-29 DIAGNOSIS — Z11.3 ENCOUNTER FOR SCREENING FOR INFECTIONS WITH A PREDOMINANTLY SEXUAL MODE OF TRANSMISSION: ICD-10-CM

## 2025-07-29 DIAGNOSIS — N92.6 IRREGULAR MENSTRUATION, UNSPECIFIED: ICD-10-CM

## 2025-07-30 ENCOUNTER — OUTPATIENT (OUTPATIENT)
Dept: OUTPATIENT SERVICES | Facility: HOSPITAL | Age: 25
LOS: 1 days | End: 2025-07-30
Payer: MEDICAID

## 2025-07-30 ENCOUNTER — APPOINTMENT (OUTPATIENT)
Dept: ULTRASOUND IMAGING | Facility: CLINIC | Age: 25
End: 2025-07-30
Payer: MEDICAID

## 2025-07-30 DIAGNOSIS — Z00.00 ENCOUNTER FOR GENERAL ADULT MEDICAL EXAMINATION WITHOUT ABNORMAL FINDINGS: ICD-10-CM

## 2025-07-30 LAB — CYTOLOGY SPEC DOC CYTO: SIGNIFICANT CHANGE UP

## 2025-07-30 PROCEDURE — 76830 TRANSVAGINAL US NON-OB: CPT | Mod: 26

## 2025-07-30 PROCEDURE — 76856 US EXAM PELVIC COMPLETE: CPT | Mod: 26

## 2025-07-30 PROCEDURE — 76830 TRANSVAGINAL US NON-OB: CPT

## 2025-07-30 PROCEDURE — 76856 US EXAM PELVIC COMPLETE: CPT

## 2025-08-01 ENCOUNTER — NON-APPOINTMENT (OUTPATIENT)
Age: 25
End: 2025-08-01

## 2025-09-08 ENCOUNTER — APPOINTMENT (OUTPATIENT)
Dept: INTERNAL MEDICINE | Facility: CLINIC | Age: 25
End: 2025-09-08

## 2025-09-15 ENCOUNTER — APPOINTMENT (OUTPATIENT)
Dept: OBGYN | Facility: CLINIC | Age: 25
End: 2025-09-15
Payer: MEDICAID

## 2025-09-15 ENCOUNTER — LABORATORY RESULT (OUTPATIENT)
Age: 25
End: 2025-09-15

## 2025-09-15 VITALS — DIASTOLIC BLOOD PRESSURE: 80 MMHG | WEIGHT: 155 LBS | SYSTOLIC BLOOD PRESSURE: 126 MMHG

## 2025-09-15 DIAGNOSIS — N89.8 OTHER SPECIFIED NONINFLAMMATORY DISORDERS OF VAGINA: ICD-10-CM

## 2025-09-15 DIAGNOSIS — Z34.90 ENCOUNTER FOR SUPERVISION OF NORMAL PREGNANCY, UNSPECIFIED, UNSPECIFIED TRIMESTER: ICD-10-CM

## 2025-09-15 DIAGNOSIS — Z11.3 ENCOUNTER FOR SCREENING FOR INFECTIONS WITH A PREDOMINANTLY SEXUAL MODE OF TRANSMISSION: ICD-10-CM

## 2025-09-15 PROCEDURE — 76830 TRANSVAGINAL US NON-OB: CPT | Mod: 26

## 2025-09-15 PROCEDURE — 99213 OFFICE O/P EST LOW 20 MIN: CPT | Mod: 25

## 2025-09-17 DIAGNOSIS — B37.31 ACUTE CANDIDIASIS OF VULVA AND VAGINA: ICD-10-CM

## 2025-09-17 DIAGNOSIS — B96.89 ACUTE VAGINITIS: ICD-10-CM

## 2025-09-17 DIAGNOSIS — N76.0 ACUTE VAGINITIS: ICD-10-CM

## 2025-09-17 RX ORDER — TERCONAZOLE 8 MG/G
0.8 CREAM VAGINAL
Qty: 1 | Refills: 0 | Status: ACTIVE | COMMUNITY
Start: 2025-09-17 | End: 1900-01-01

## 2025-09-17 RX ORDER — METRONIDAZOLE 7.5 MG/G
0.75 GEL VAGINAL
Qty: 1 | Refills: 0 | Status: ACTIVE | COMMUNITY
Start: 2025-09-17 | End: 1900-01-01

## 2025-09-25 PROBLEM — Z11.3 ROUTINE SCREENING FOR STI (SEXUALLY TRANSMITTED INFECTION): Status: ACTIVE | Noted: 2025-09-25

## 2025-09-25 PROBLEM — Z64.0 UNPLANNED UNWANTED PREGNANCY: Status: ACTIVE | Noted: 2025-09-25

## 2025-09-26 PROBLEM — Z33.2 MEDICAL ABORTION: Status: ACTIVE | Noted: 2025-09-26
